# Patient Record
Sex: FEMALE | Race: ASIAN | NOT HISPANIC OR LATINO | Employment: STUDENT | URBAN - METROPOLITAN AREA
[De-identification: names, ages, dates, MRNs, and addresses within clinical notes are randomized per-mention and may not be internally consistent; named-entity substitution may affect disease eponyms.]

---
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
## 2024-01-03 ENCOUNTER — TELEPHONE (OUTPATIENT)
Age: 19
End: 2024-01-03

## 2024-01-03 NOTE — TELEPHONE ENCOUNTER
Caller: Jolly    Doctor: Yola     Reason for call: Has some question regarding her sx     Call back#: [phone]

## 2024-01-04 ENCOUNTER — ANESTHESIA EVENT (OUTPATIENT)
Dept: PERIOP | Facility: HOSPITAL | Age: 19
End: 2024-01-04
Payer: COMMERCIAL

## 2024-01-04 RX ORDER — ACETAMINOPHEN 500 MG
500 TABLET ORAL EVERY 6 HOURS PRN
COMMUNITY

## 2024-01-04 RX ORDER — GUANFACINE 2 MG/1
TABLET, EXTENDED RELEASE ORAL EVERY MORNING
COMMUNITY

## 2024-01-04 NOTE — PRE-PROCEDURE INSTRUCTIONS
Pre-Surgery Instructions:   Medication Instructions    acetaminophen (TYLENOL) 500 mg tablet Hold day of surgery.    citalopram (CeleXA) 20 mg tablet Take day of surgery.    drospirenone-ethinyl estradiol (FLIP) 3-0.02 MG per tablet Take day of surgery.    fexofenadine (ALLEGRA) 60 MG tablet Take day of surgery.    guanFACINE HCl ER (Intuniv) 2 MG TB24 Take day of surgery.    lamoTRIgine (LaMICtal) 100 mg tablet Take day of surgery.    rimegepant sulfate (Nurtec) 75 mg TBDP Uses PRN- OK to take day of surgery    Ubrogepant (UBRELVY) 100 MG tablet Uses PRN- OK to take day of surgery    Medication instructions for day surgery reviewed. Please use only a sip of water to take your instructed medications. Avoid all over the counter vitamins, supplements and NSAIDS for one week prior to surgery per anesthesia guidelines. Tylenol is ok to take as needed.     You will receive a call one business day prior to surgery with an arrival time and hospital directions. If your surgery is scheduled on a Monday, the hospital will be calling you on the Friday prior to your surgery. If you have not heard from anyone by 8pm, please call the hospital supervisor through the hospital  at [phone]. (Sudeep [phone]).    Do not eat or drink anything after midnight the night before your surgery, including candy, mints, lifesavers, or chewing gum. Do not drink alcohol 24hrs before your surgery. Try not to smoke at least 24hrs before your surgery.       Follow the pre surgery showering instructions as listed in the “My Surgical Experience Booklet” or otherwise provided by your surgeon's office. Do not use a blade to shave the surgical area 1 week before surgery. It is okay to use a clean electric clippers up to 24 hours before surgery. Do not apply any lotions, creams, including makeup, cologne, deodorant, or perfumes after showering on the day of your surgery. Do not use dry shampoo, hair spray, hair gel, or any type of hair  products.     No contact lenses, eye make-up, or artificial eyelashes. Remove nail polish, including gel polish, and any artificial, gel, or acrylic nails if possible. Remove all jewelry including rings and body piercing jewelry.     Wear causal clothing that is easy to take on and off. Consider your type of surgery.    Keep any valuables, jewelry, piercings at home. Please bring any specially ordered equipment (sling, braces) if indicated.    Arrange for a responsible person to drive you to and from the hospital on the day of your surgery. Visitor Guidelines discussed.     Call the surgeon's office with any new illnesses, exposures, or additional questions prior to surgery.    Please reference your “My Surgical Experience Booklet” for additional information to prepare for your upcoming surgery.

## 2024-01-05 ENCOUNTER — TELEMEDICINE (OUTPATIENT)
Dept: NEUROLOGY | Facility: CLINIC | Age: 19
End: 2024-01-05
Payer: COMMERCIAL

## 2024-01-05 DIAGNOSIS — G43.009 MIGRAINE WITHOUT AURA AND WITHOUT STATUS MIGRAINOSUS, NOT INTRACTABLE: Primary | ICD-10-CM

## 2024-01-05 PROCEDURE — 99214 OFFICE O/P EST MOD 30 MIN: CPT | Performed by: PSYCHIATRY & NEUROLOGY

## 2024-01-05 RX ORDER — DEXAMETHASONE 4 MG/1
TABLET ORAL
Qty: 9 TABLET | Refills: 0 | Status: SHIPPED | OUTPATIENT
Start: 2024-01-05

## 2024-01-05 NOTE — PATIENT INSTRUCTIONS
Please follow-up with eye doctor for dilated eye exam and let me know if they see any signs of anything unusual at the back your eye and please do not trust that they will send me the report.      Headache/migraine treatment:   Abortive medications (for immediate treatment of a headache):   It is ok to take ibuprofen, acetaminophen or naproxen (Advil, Tylenol,  Aleve, Excedrin) if they help your headaches you should limit these to No more than 3 times a week to avoid medication overuse/rebound headaches.     For your more moderate to severe migraines take this medication early   -   Ubrelvy 100 mg at migraine onset and can repeat in 2 hours if needed for max 2 doses in 24 hours and you get 16 tabs a month as there is some likelihood that the more you take this it also helps with prevention    If you have a co-pay that is anything of significance there is a coupon card  @ubrelvy.com to cover this    Back up:  -     dexamethasone (DECADRON) 4 mg tablet; Can take 8 mg p.o. with breakfast for 1 to 2 days followed by 4 mg for 1 to 5 days for unrelenting migraine      Over the counter preventive supplements for headaches/migraines (if you try, try for 3 months straight)   (to take every day to help prevent headaches - not to take at the time of headache):  There are combo pills online of these - none of which regulated by FDA and double check dosing - take appropriate dose only once a day- preventa migraine, migravent, mind ease, migrelief   []  Magnesium 400mg daily (If any diarrhea or upset stomach, decrease dose  as tolerated)  []  Riboflavin (Vitamin B2) 400mg daily - try online   (FYI B2 may make your urine bright/neon yellow)  [] Butter bur     Prescription preventive medications for headaches/migraines   (to take every day to help prevent headaches - not to take at the time of headache):  [x]  We have options if needed        Lifestyle Recommendations:  [x] SLEEP - Maintain a regular sleep schedule: Adults need at  least 7-8 hours of uninterrupted a night. Maintain good sleep hygiene:  Going to bed and waking up at consistent times, avoiding excessive daytime naps, avoiding caffeinated beverages in the evening, avoid excessive stimulation in the evening and generally using bed primarily for sleeping.  One hour before bedtime would recommend turning lights down lower, decreasing your activity (may read quietly, listen to music at a low volume). When you get into bed, should eliminate all technology (no texting, emailing, playing with your phone, iPad or tablet in bed).  [x] HYDRATION - Maintain good hydration.  Drink  2L of fluid a day (4 typical small water bottles)  [x] DIET - Maintain good nutrition. In particular don't skip meals and try and eat healthy balanced meals regularly.  [x] TRIGGERS - Look for other triggers and avoid them: Limit caffeine to 1-2 cups a day or less. Avoid dietary triggers that you have noticed bring on your headaches (this could include aged cheese, peanuts, MSG, aspartame and nitrates).  [x] EXERCISE - physical exercise as we all know is good for you in many ways, and not only is good for your heart, but also is beneficial for your mental health, cognitive health and  chronic pain/headaches. I would encourage at the least 5 days of physical exercise weekly for at least 30 minutes.     Education and Follow-up  [x] Please call with any questions or concerns. Of course if any new concerning symptoms go to the emergency department.  [x] Follow up May or June 2024, sooner if needed

## 2024-01-05 NOTE — PROGRESS NOTES
Virtual Regular Visit    Verification of patient location:    Patient is located at Home in the following state in which I hold an active license [place]      Assessment/Plan:    Problem List Items Addressed This Visit    None  Visit Diagnoses       Migraine without aura and without status migrainosus, not intractable    -  Primary    Relevant Medications    dexamethasone (DECADRON) 4 mg tablet                 Reason for visit is   Chief Complaint   Patient presents with    Migraine        Encounter provider Yesi Huber MD    Provider located at [facility]  NEUROLOGY [address]  [place] [address] [phone]      Recent Visits  No visits were found meeting these conditions.  Showing recent visits within past 7 days and meeting all other requirements  Today's Visits  Date Type Provider Dept   01/05/24 Telemedicine Yesi Huber MD [facility]   Showing today's visits and meeting all other requirements  Future Appointments  No visits were found meeting these conditions.  Showing future appointments within next 150 days and meeting all other requirements       The patient was identified by name and date of birth. Jolly Ibanez was informed that this is a telemedicine visit and that the visit is being conducted through the Epic Embedded platform. She agrees to proceed..  My office door was closed. The patient was notified the following individuals were present in the room med student Masoud Wang.  She acknowledged consent and understanding of privacy and security of the video platform. The patient has agreed to participate and understands they can discontinue the visit at any time.    Patient is aware this is a billable service.     Subjective    Assessment/Plan:   Jolly Ibanez is a pleasant  18 y.o. female with a past medical history that includes PTSD, anxiety, depression, disruptive mood dysregulation disorder, PMS, PMDD, seasonal  allergies, Migraines, insomnia, BMI over 40, hypertension and tachycardia referred here for evaluation of headache.  My initial evaluation 2/1/2022     Migraine without aura and without status migrainosus, not intractable  H/O concussion - resolved  No EMILY while sleeping on side (5/18/23 PSG, only 0.4% of study on back)  Jolly has a history of migraines prior to the hit to the head about once a week on average and worse around menses.  On 10/15/2021, a metal latch fell on the bridge of her nose, and at 1st she denied any acute symptoms, except for pain in the region followed by about 20 minutes later fatigue and other constellation of symptoms that can be typical of concussion.  She followed up with primary care provider and later emergency department 10/18/2021 where noncontrast head CT was unremarkable for acute pathology.  She subsequently followed up with Sports Medicine and Physical therapy and was recently evaluated by Occupational therapy.  She feels these therapies are helping.  - as of 02/01/2022 she reports gradual improvement of symptoms although still having daily posttraumatic headaches they have gone from severe and disabling every day to mild 2 to 3/10 daily and worse moderate 2 to 3 times a week.  She is not interested in prescription preventative, will offer rizatriptan for abortive of more significant migraines.  She has a history of mood disorder which has been exacerbated by the stress and trauma this incident and removal from normal routine we discussed gradual return to normalcy.  Continue to follow with counselor, on Lamictal through PCP.  We discussed symptoms of posttraumatic stress and functional neurologic disorder contributing to symptoms.  - as of 3/4/2022: She reports improvement since last visit, balance improving, still daily headaches, but milder and has not needed rizatriptan yet at all. Taking all 3 headache preventative supplements. Not seen sleep med yet. Looking for new  psychiatrist. Feels PT/OT helpful.  - as of 6/8/2022: She continues to have gradual improvement of symptoms including decreased frequency and severity of headaches and migraines with daily headaches much milder and about 3-4 significant migraines where she took rizatriptan in the past 3 months and at helped.  She is not interested in prescription preventative for headache and migraine at this time and has had adjustments of her mood medications including reacting citalopram and increasing lamotrigine.  - as of 2/6/2023: On 2/4/2023 was feeling lightheaded like she might pass out while out with boyfriend and on 2/5/2023 had prodrome as well of feeling off balance and lightheaded before what sounds like presyncopal event with some likely functional overlay that evening while studying for a test that was to be today. History solely from her recollection and report of what others told her.  She was evaluated in the ED locally afterwards and she reports vitals and blood work was normal and no imaging was performed.  Suspect sleep disorder playing a role and again highly recommended following through with sleep study ordered a year ago. In the past has been known to fall asleep when overwhelmed consistent with functional neurologic disorder/PTSD.  Pattern of events does not sound consistent with seizure disorder at this time, but certainly without brain imaging would want to obtain to rule out structural cause of these symptoms.  She does not have a car or drive currently.  Migraines ups and downs, were worse in October and a little better now with lifestyle changes 2-3 times a week and rizatriptan works typically.    - as of 3/27/2023: Normal MRI brain reviewed.  She reports improvement since last visit with headaches on average 3 to 4 a week with the headache preventative supplements and current medications through psychiatry and her more significant migraines respond to rizatriptan when she takes it.  She is not  interested in prescription preventative for headaches or migraines.  Sleep study scheduled for May and no more presyncopal or dissociative episodes since last visit, not driving anyway which we discussed.   - as of 6/27/2023: We discussed that since I last saw her,  I have discovered that many patients may have a subclinical IIH picture contributing to their symptoms following a concussion, which may have been part of her picture as well and I believe it can cause cervical medullary compression syndrome at times (theory and non surgically).  She is just about to stop citalopram and start Wellbutrin tomorrow for mood/ADHD and we discussed holding off on acetazolamide/Diamox trial at this point but could a time to not change more than 1 medication at once, but could add at any time.  She reports overall headaches are better now that summer classes are over and approximately 2 a week and rizatriptan may help a little but not much and she does not like taking meds.  We discussed steroids may help even more if needed in the future for severe symptoms.  Sleep study was negative for sleep apnea while sleeping on her left side the entire night and we discussed I still suspect she has sleep apnea on her back which will not matter with the negative test as long as she continues to sleep on her side.  Recommended Zzoma pillow.   - as of 8/24/2023: She is doing well overall with approximately 3-4 headaches a week that typically resolve with acetaminophen/Tylenol and rizatriptan works sometimes, but trial of sumatriptan to see if this can work better and if not certainly could try Nurtec to see if this could help for each episode as more she takes it also can help with prevention.  She did not tolerate psychiatry trial of Wellbutrin and was put back on citalopram and added guanfacine.  We discussed sleep hygiene and headache management and will hold off on additional headache preventatives at this time unless needed.  She will  follow-up with eye doctor and let me know if any concerning findings due to her subtle left vision issues and certainly if she had papilledema that would  but otherwise she does not meet criteria for IIH at this time.  - as of 1/5/2024: She reports on average 5-6 migraines a month and sumatriptan side effects, Nurtec denied and Ubrelvy preferred by insurance and typically works well for migraine rescue.  Typically does not need a second and 24 hours and denies any side effects.  She is not interested in adding migraine prescription preventative.  She had some recent episodes that she reports are similar to what have been thought to be related to functional neurologic disorder in the past, sounds like possible vasovagal syncope in the setting of strep in November and prolonged recovery.  We discussed at any point could consider acetazolamide type medication to see if they could help with multiple symptoms if interested although sometimes difficult to tolerate since has to be taken every 3 hours until titrating up to 500 mg twice daily which also may only last 8 hours.  Dexamethasone available for backup for unrelenting migraine or to see if it could help in an atypical FND episode.    Workup:  -MRI brain with and without contrast 3/13/2023: per radiology Normal MRI of the brain.  Mild sinus disease. *On my retrospective review 6/27/2023 we discussed I see some findings of suspected mild increased intracranial pressure including slightly flattened sella (however not empty and not partially empty), slightly prominent optic nerve sheaths with mildly tortuous optic nerves, slight pallor on left and slightly flattened sclera in my opinion, radiology would label this as non specific    -Eye exam uploaded from 12/22/2023 in our system under media dated 1/3/2024 with no papilledema, diagnosis bilateral myopia    Preventative:  - we discussed headache hygiene and lifestyle factors that may improve  headaches  - she is not interested in prescription preventative at this time and we discussed options if she becomes interested - continue headache preventative supplements including magnesium, riboflavin and butterbur   - Currently on through other providers:  Lamotrigine 100 mg in am, citalopram 20 mg in am, guanfacine 2 mg for ADHD since 8/5/23 and increased to 2 mg around Oct 2023 - has noticed a difference and helping   - Past/ failed/contraindicated:  Fluoxetine, sertraline, citalopram, amitriptyline and venlafaxine would be contraindicated due to potential interaction with current medications, wellbutrin made her angry, aggressive and anxious all the time   - future options: Acetazolamide/Diamox, topiramate, propranolol, CGRP med, botox    Abortive:  - discussed not taking over-the-counter or prescription pain medications more than 3 days per week to prevent medication overuse/rebound headache  - continue Ubrelvy 100 mg as needed. Discussed proper use, possible side effects and risks.  - Currently on through other providers: tylenol   - Past/ failed/contraindicated:  She reports No NSAIDs due to allergy of swelling, rizatriptan, sumatriptan  - future options:  prochlorperazine, could consider trial for 5 days of Depakote or dexamethasone for prolonged migraine, reyvow, nurtec (had to try ubrelvy first)  - Safe driving precautions, should not drive at all if feeling sleepy or cognitively not well.        Patient instructions          Headache/migraine treatment:   Abortive medications (for immediate treatment of a headache):   It is ok to take ibuprofen, acetaminophen or naproxen (Advil, Tylenol,  Aleve, Excedrin) if they help your headaches you should limit these to No more than 3 times a week to avoid medication overuse/rebound headaches.     For your more moderate to severe migraines take this medication early   -   Ubrelvy 100 mg at migraine onset and can repeat in 2 hours if needed for max 2 doses in 24  hours and you get 16 tabs a month as there is some likelihood that the more you take this it also helps with prevention    If you have a co-pay that is anything of significance there is a coupon card  @ubrelvy.com to cover this    Back up:  -     dexamethasone (DECADRON) 4 mg tablet; Can take 8 mg p.o. with breakfast for 1 to 2 days followed by 4 mg for 1 to 5 days for unrelenting migraine      Over the counter preventive supplements for headaches/migraines (if you try, try for 3 months straight)   (to take every day to help prevent headaches - not to take at the time of headache):  There are combo pills online of these - none of which regulated by FDA and double check dosing - take appropriate dose only once a day- preventa migraine, migravent, mind ease, migrelief   []  Magnesium 400mg daily (If any diarrhea or upset stomach, decrease dose  as tolerated)  []  Riboflavin (Vitamin B2) 400mg daily - try online   (FYI B2 may make your urine bright/neon yellow)  [] Butter bur     Prescription preventive medications for headaches/migraines   (to take every day to help prevent headaches - not to take at the time of headache):  [x]  We have options if needed        Lifestyle Recommendations:  [x] SLEEP - Maintain a regular sleep schedule: Adults need at least 7-8 hours of uninterrupted a night. Maintain good sleep hygiene:  Going to bed and waking up at consistent times, avoiding excessive daytime naps, avoiding caffeinated beverages in the evening, avoid excessive stimulation in the evening and generally using bed primarily for sleeping.  One hour before bedtime would recommend turning lights down lower, decreasing your activity (may read quietly, listen to music at a low volume). When you get into bed, should eliminate all technology (no texting, emailing, playing with your phone, iPad or tablet in bed).  [x] HYDRATION - Maintain good hydration.  Drink  2L of fluid a day (4 typical small water bottles)  [x] DIET -  Maintain good nutrition. In particular don't skip meals and try and eat healthy balanced meals regularly.  [x] TRIGGERS - Look for other triggers and avoid them: Limit caffeine to 1-2 cups a day or less. Avoid dietary triggers that you have noticed bring on your headaches (this could include aged cheese, peanuts, MSG, aspartame and nitrates).  [x] EXERCISE - physical exercise as we all know is good for you in many ways, and not only is good for your heart, but also is beneficial for your mental health, cognitive health and  chronic pain/headaches. I would encourage at the least 5 days of physical exercise weekly for at least 30 minutes.     Education and Follow-up  [x] Please call with any questions or concerns. Of course if any new concerning symptoms go to the emergency department.  [x] Follow up May or June 2024, sooner if needed        CC:   We had the pleasure of evaluating Jolly Ibanez in neurological consultation today. Jolly Ibanez is a   right handed female who presents today for evaluation of headaches.     History obtained from patient as well as available medical record review.  History of Present Illness:   Interval history as of 1/5/2024  - she reports she feels fine  - school is going well   - drives sparingly, reports she would never drive if feeling sick or tired   - a few episodes similar to what have been worked up and labeled functional neurologic disorder in the past, since last visit - some details in EMR, but not all, spoke with her around that time, viral illness symptoms, plus possible neurocardiogenic syncope and sent to  ER 11/12/23 and turned out she had strep and although she did not have throat symptoms that she recalls at the time only GI symptoms, other times needed a minute and then was ok. After the Nov incident that turned out to be strep was on a soup diet for 1.5 weeks and then felt better. All happened away from here so briefly summarized and she reports she is doing well now.      -Eye exam uploaded from 12/22/2023 in our system under media dated 1/3/2024 with no papilledema, diagnosis bilateral myopia    - surgery on foot in 4 days, non union fracture in foot - back to school 1/16/24    Headaches and migraines   5-6 migraines a month - works pretty well    Preventative:   - Currently on through other providers:  Lamotrigine 100 mg in am, citalopram 20 mg in am, guanfacine 2 mg for ADHD since 8/5/23 and increased to 2 mg around Oct 2023 - has noticed a difference and helping    Abortive:   -Trial of sumatriptan hand side effects and therefore I sent Nurtec 9/18/2023 which was denied and Ubrelvy preferred by your insurance - didn't pay a lot - 16 tabs and no SE and only once took two     Denies bothersome side effects     Interval history as of 8/24/2023  - no significant new or concerning neurologic symptoms since last visit   - sleep - 6 hours, up every couple of hours, side sleeping, nightmares 1-2 times a week   - mostly at home, beach yesterday  - eye doctor 12/2021 - wear glasses - some blurred vision at times, worse in left she thinks   (5-6 months prior had seen [facility] and got glasses and then worse after concussion, and then found they over corrected her and she has a stigmatism)    Headaches and migraines   Jolly gets 3-4 headaches a week, normally she takes tylenol for them and they resolve, but it they are really bad she takes rizatriptan and that usually works.     Preventative:   - Currently on through other providers:  Lamotrigine 100 mg in am, citalopram 20 mg in am, guanfacine 2 mg for ADHD since 8/5/23 and increased to 2 mg around Oct 2023 - has noticed a difference and helping  - wellbutrin made her angry, aggressive and anxious all the time     Abortive:   Rizatriptan works sometimes, but cannot take it as often as she needs to    Interval history as of 6/27/2023  - no significant new or concerning neurologic symptoms since last visit     Headaches and migraines   2  headaches a week and rizatriptan works   Bifrontal pressure  Better now that school is done for the summer, class ended on 6/8/23 gen chem 2   ADHD feels worse than in HS and therefore they are switching her to wellbutrin for that reasons     Tripped over two months ago and swollen two weeks later, two weeks after bruised     Preventative:   - Currently on through other providers:  Lamotrigine 100 mg, citalopram 20 mg weaning off this month and starting wellbutrin tomorrow     Abortive:   - rizatriptan may help a little but not much and does not like taking meds, sometimes sleeps   Denies bothersome side effects   - May 14 -20 steroids for ankle  - no isses     Sleep  In lab study 5/19/23  There were a total of 2 respiratory events made up of 0 obstructive apneas, 0 mixed apneas, 0 central apneas, and 2 hypopneas resulting in an apnea/hypopnea index (AHI) of 0.3 events per hour of sleep.  The AHI in the supine position was 0.0.  The AHI during REM sleep was 0.0.  No snoring was noted on the study night.  OXIMETRY: The baseline oxygen saturation with the patient awake was 97.8%.  The mean oxygen saturation throughout the study was 97.5%.  The lowest oxygen saturation was 93.0%.  BODY POSITION: The patient slept 0.4% of the evening in the supine position, 99.6% on left side, 0.0% on right side, and 0.0%  in the prone position.   IMPRESSION:   This study demonstrated no evidence for sleep disordered breathing or movement disorder.  Normal sleep efficiency and sleep architecture         Interval history as of 3/27/2023  - no significant new or concerning neurologic symptoms since last visit   -MRI brain with and without contrast 3/13/2023: Normal MRI of the brain.  Mild sinus disease.  - sleep study scheduled for May 18th 2023  - Mood - just started seeing a therapist at college, overall ok, 2 apmts so far, psychiatry next unknown, last was 3/11/23 with NP     Headaches and migraines   MRI brain reviewed  She has been  doing better since I last saw her she reports headaches 3 to 4 days/week which is better than daily and constant and rizatriptan does help when she needs it.    Preventative:   - headache preventative supplements  - Currently on through other providers:  Lamotrigine 100 mg, citalopram 20 mg    Abortive:   -Rizatriptan - works  Denies bothersome side effects        Interval history as of 2/6/2023  -Since last visit I wrote letter to her school for disability services for migraines including ability fracture time if necessary  - light bump 2-3 days ago while getting something from sisters bed and not too hard, and no symptoms at the time except headache later   - sat out with BF and and had to sit down as almost past out, 1 hydroflask water a day   - started feeling off balance while walking to dinner with friend, lightheaded, ate at dinner, went back to dorm to study and making flashcards and two hours later started feeling like she could barely keep head up and friends left room for a moment and was slumped over and barely continuous and sitting at a desk and they helped her up and she slumped back down to chair and then they got RA. They eased her to the ground and called school EMS and they helped her out and turned to her side. While on the ground whole body shaking and would last 1-1.5 mins and then would start again and relaxed in hospital and she felt out of it.   No urinary or bowel incontinence, no tongue biting, no history of seizures  In ED around 830/9 pm, took vitals abd blood work and came back normal and she says she couldn't really talk to them, was staring but couldn't really see them, responses were brief  - This morning was up and about with dad studying and eyes started to roll and he told her to control her breathing and everything got better.     Sleep  - not good per dad, tries to go bed by 12 and 647, broken    -I again recommended considering sleep study and referred her over a year  ago    Headaches and migraines   -10/14/2022 wrote me regarding an uptake in the number of headaches per week with more intense schoolwork since the beginning of the semester and has been forgetting a few meals which likely is part of the issue, but she reported she is exercising an hour a day due to walking around campus and sleeping 6 hours per night -I again recommended considering sleep study and referred her over a year ago -and asked if she would like to try prescription preventative medication for headaches    - were bad in Oct and then got a little better and now 2-3 times a week     Preventative:   - None specifically for headaches, headache preventative supplements  - Currently on through other providers:  Lamotrigine 100 mg (never higher), citalopram 20 mg     Abortive: Rizatriptan for migraines - works   Denies bothersome side effects        How likely are you to doze off or fall sleep during the following situations?  0 - would never doze  1 - slight chance of dozing  2 - moderate chance of dozing  3 - high chance of dozing  [place] sleepiness scale  Sitting and reading - 2  Watching TV - 1  Sitting, inactive in a public place such as a theater 1  As a passenger in a car for an hour without a break 2  Lying down to rest in afternoon when circumstances permit 2  Sitting and talking to someone 1  Sitting quietly after lunch without alcohol 1  In a car while stopped for few minutes in traffic - 0        Interval history as of 6/8/2022  - finished therapies now and feels they helped  - mood is better, continues to follow with therapist and has new Psychiatry and P adjusting medications  - going to college in the [employer] - wants to go into medicine   - still sometimes overwhelmed by overstimulation  - did not follow through with sleep medicine evaluation and I recommended this again to rule out other treatable causes of headaches and other symptoms as I suspect possible sleep apnea    Headaches and  "migraines   Less frequent and less intense  Still about once a day, about 1-2/10 now, normally can go up to a 5/10, up to 7-8/10  Taken rizatriptan about 3-4 times in 3 months     Preventative:   -  headache preventative supplement - already was on magnesium, B2, added butterbur  - through other providers: restarted citalopram and increase lamotrigine     Abortive: rizatriptan works, sometimes takes a while - up to an hour.   Denies bothersome side effects    Interval history as of 3/4/2022  - denies any new or concerning neurologic symptoms since last visit   - easing into normalcy, stopped wearing hat to prevent light from bothering her, has noticed that she gets more visual snow  - Balance improving, working with PT on fine tuning. No recent falls.   - Has not seen sleep medicine    Headaches and migraines   - headaches less painful than what they used to be. Not taking maxalt as it never been bad enough.   21 headaches in a week. All different. Different triggers such as light or noise.   No new symptoms.     Preventative:    - trial of headache preventative supplement - already was on magnesium, B2, added butterbur  Abortive:   Trial rizatriptan-has not tried as has not had severe migraine    School  [employer], senior  Primary focus painting and art    Mood  - history of  PTSD, anxiety, depression, disruptive mood dysregulation disorder, PMS, PMDD, insomnia  - previously followed with Psychiatry, she was not the right person, looking for new provider  - follows with counselor she likes   - through peds - lamictal 75 mg in am  - 2 years, no SE  -  tried reading \"the body keeps the score\" on PTSD and triggers symptoms - okay'd stopping as may not be the right time to delve into that     History as of initial visit 2/1/22:  On 10/15/2021, a metal gate latch fell on the bridge of her nose at school, hitting glasses  Acute symptoms included:  No LOC, no amnesia, laughed at the time, did not feel " "weird until 20 mins later felt really tired, post traumatic headache right away, lightheaded, 2 hours later that day she fell from her chair and math class   She was evaluated by primary care provider  She was evaluated emergency department 10/18/2021 where she reported dizziness , fatigue, headache, nausea, imbalance.  Noncontrast head CT was unremarkable for acute pathology    She subsequently followed up with sports medicine   - 10/27/2021  - 11/18/2021  - 01/04/2022 - felt 83% back to normal and referred to neuro  She is also followed with optometry, PT and OT - vision therapy started yesterday     - as of 2/1/2022:  She reports gradual improvement of some symptoms still having daily posttraumatic headaches    School  [employer], senior  Primary focus painting and art  - was out of school for a week, then out intermittently sent home early  Back in school full time  accommodations - sometimes wears sunglasses or hats, can leave class early, extra time on assignments  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   - before this once a week, migraines more around menses   - as of 2/1/2022: daily severe headaches initially, now mild daily, moderate 2-3 a week   What time of the day do the headaches start?  Does not often wake up with them Build over the day  How long do the headaches last? Bad day the rest of the day  Are you ever headache free? yes    Aura? without aura     Last eye exam:   Saw " optometry fall 2021    Where is your headache located and pain quality?   - frontal - typically bilateral, but can be more on left   - sometimes a pressure, sometimes a , sometimes stabbing  What is the intensity of pain? Average: 2-3/10, worst 7/10  Associated symptoms:   [x] Nausea       [] Vomiting      [x] Photophobia     [x]Phonophobia      [] Osmophobia  [x] Blurred vision    [x] Prefer quiet, dark room  [x] Light-headed or dizzy - feels like she is swaying at times, waxes and wanes     [] Tinnitus   [] Hands or feet tingle or feel numb/paresthesias    [] Ptosis      [] Facial droop  [] Lacrimation  [] Nasal congestion/rhinorrhea        Things that make the headache worse? No specific movements, any shaking movement     Headache triggers:  Menses, stress, driving sometimes    Have you seen someone else for headaches or pain? Yes, Sports med   Have you had trigger point injection performed and how often? No  Have you had Botox injection performed and how often? No   Have you had epidural injections or transforaminal injections performed? No  Are you current pregnant or planning on getting pregnant? Not for years,  on birth control  Have you ever had any Brain imaging? ct    What medications do you take or have you taken for your headaches?   ABORTIVE:    Tylenol 2 pills in am helps    No NSAIDs due to allergy     PREVENTIVE:     Lamotrigine 75 mg daily       Past/ failed/contraindicated:  Fluoxetine  Sertraline  citalopram      Water: 3-4 bottles  per day  Caffeine: 1 cup coffee once a week       The following portions of the patient's history were reviewed and updated as appropriate: allergies, current medications, past family history, past medical history, past social history, past surgical history and problem list.    Pertinent family history:  Family history of headaches:  no known family members with significant headaches  Any family history of aneurysms - Yes - paternal grandmother     Pertinent  social history:  Work: 0  Education: HS  Lives with spit time with mom and dad    Past Medical History:   Diagnosis Date    Anxiety     Contact lens/glasses fitting     Depression     Fracture     non-union bone fragment right foot    Head injury 2021    trauma to the head-physical therapy and occupational therapy    Migraine     Piercing     8 total in the ears       Past Surgical History:   Procedure Laterality Date    ANKLE SURGERY Right        Current Outpatient Medications   Medication Sig Dispense Refill    acetaminophen (TYLENOL) 500 mg tablet Take 500 mg by mouth every 6 (six) hours as needed for mild pain      citalopram (CeleXA) 20 mg tablet Take 20 mg by mouth every morning      dexamethasone (DECADRON) 4 mg tablet Can take 8 mg p.o. with breakfast for 1 to 2 days followed by 4 mg for 1 to 5 days for unrelenting migraine 9 tablet 0    drospirenone-ethinyl estradiol (FLIP) 3-0.02 MG per tablet Take 1 tablet by mouth every morning      fexofenadine (ALLEGRA) 60 MG tablet Take 60 mg by mouth every morning      guanFACINE HCl ER (Intuniv) 2 MG TB24 Take by mouth every morning      lamoTRIgine (LaMICtal) 100 mg tablet Take 100 mg by mouth every morning      Ubrogepant (UBRELVY) 100 MG tablet Take 1 tablet (100 mg) one time as needed for migraine. May repeat one additional tablet (100 mg) at least two hours after the first dose. Do not use more than two doses per day 16 tablet 11     No current facility-administered medications for this visit.        Allergies   Allergen Reactions    Ibuprofen Swelling and Anaphylaxis    Dog Epithelium Itching     Itching and hives-cats/dogs         Objective:     Exam limited by Video  Physical Exam:                                                                 Vitals:            Constitutional:    LMP 12/31/2023 (Exact Date)   BP Readings from Last 3 Encounters:   11/24/23 120/69   08/07/23 130/80   02/06/23 125/64     Pulse Readings from Last 3 Encounters:   11/24/23 89    08/07/23 80   02/06/23 84         Well developed, well nourished, No dysmorphic features.       HEENT:  Normocephalic atraumatic. See neuro exam   Psychiatric:  Normal behavior and appropriate affect        Neurological Examination:     Mental status/cognitive function:   Recent and remote memory appear intact. Attention span and concentration as well as fund of knowledge appear appropriate for age. Normal language and spontaneous speech.  Cranial Nerves:  VII-facial expression symmetric  Motor Exam: symmetric bulk throughout. no atrophy, fasciculations or abnormal movements noted.   Coordination:  no apparent dysmetria, ataxia or tremor noted      Pertinent lab results:   See EMR for recent labs  - 10/18/2021 CMP and CBC unremarkable  Pregnancy test negative     Imaging:   I have personally reviewed imaging and radiology read   -MRI brain with and without contrast 3/13/2023: Normal MRI of the brain. Mild sinus disease.   *On my retrospective review 6/27/2023 we discussed I see some findings of suspected mild increased intracranial pressure including flattened sella, prominent optic nerve sheaths with mildly tortuous optic nerves and slightly flattened sclera.     - noncontrast head CT 10/18/2021:  No acute intracranial abnormality     Review of Systems:   ROS obtained by medical assistant and personally reviewed, but if any symptoms listed below say negative, does not mean patient has not had this symptom since last visit, please see HPI for details of symptoms discussed this visit. I recommended PCP follow up for non neurologic problems.    Review of Systems   Constitutional:  Negative for appetite change and fever.   HENT: Negative.  Negative for hearing loss, tinnitus, trouble swallowing and voice change.    Eyes: Negative.  Negative for photophobia and pain.   Respiratory: Negative.  Negative for shortness of breath.    Cardiovascular: Negative.  Negative for palpitations.   Gastrointestinal: Negative.   Negative for nausea and vomiting.   Endocrine: Negative.  Negative for cold intolerance.   Genitourinary: Negative.  Negative for dysuria, frequency and urgency.   Musculoskeletal: Negative.  Negative for myalgias and neck pain.   Skin: Negative.  Negative for rash.   Neurological:  Positive for headaches. Negative for dizziness, tremors, seizures, syncope, facial asymmetry, speech difficulty, weakness, light-headedness and numbness.   Hematological: Negative.  Does not bruise/bleed easily.   Psychiatric/Behavioral: Negative.  Negative for confusion, hallucinations and sleep disturbance.    All other systems reviewed and are negative.    I have spent 22 minutes with Patient today in which greater than 50% of this time was spent in counseling/coordination of care regarding Diagnostic results, Prognosis, Risks and benefits of tx options, Instructions for management, Patient and family education, Importance of tx compliance, Risk factor reductions, Impressions, Counseling / Coordination of care, Documenting in the medical record, Reviewing / ordering tests, medicine, procedures  , and Obtaining or reviewing history  . I also spent 10 minutes non face to face for this patient the same day.           Visit Time  Total Visit Duration: 32

## 2024-01-05 NOTE — PROGRESS NOTES
Review of Systems   Constitutional:  Negative for appetite change and fever.   HENT: Negative.  Negative for hearing loss, tinnitus, trouble swallowing and voice change.    Eyes: Negative.  Negative for photophobia and pain.   Respiratory: Negative.  Negative for shortness of breath.    Cardiovascular: Negative.  Negative for palpitations.   Gastrointestinal: Negative.  Negative for nausea and vomiting.   Endocrine: Negative.  Negative for cold intolerance.   Genitourinary: Negative.  Negative for dysuria, frequency and urgency.   Musculoskeletal: Negative.  Negative for myalgias and neck pain.   Skin: Negative.  Negative for rash.   Neurological:  Positive for headaches. Negative for dizziness, tremors, seizures, syncope, facial asymmetry, speech difficulty, weakness, light-headedness and numbness.   Hematological: Negative.  Does not bruise/bleed easily.   Psychiatric/Behavioral: Negative.  Negative for confusion, hallucinations and sleep disturbance.    All other systems reviewed and are negative.

## 2024-01-08 NOTE — ANESTHESIA PREPROCEDURE EVALUATION
Procedure:  Removal of bone fragment secondary to non union (Right: Foot)    Relevant Problems   NEURO/PSYCH   (+) Chronic pain of right ankle      PULMONARY   (+) Obstructive sleep apnea (adult) (pediatric)      Musculoskeletal and Integument   (+) Nonunion of foot fracture, right   Morbidly obese     Physical Exam    Airway    Mallampati score: III  TM Distance: >3 FB  Neck ROM: full     Dental   Comment: Denies loose teeth     Cardiovascular  Cardiovascular exam normal    Pulmonary  Pulmonary exam normal     Other Findings  Portions of exam deferred due to low yield and/or unknown COVID statuspost-pubertal.      Anesthesia Plan  ASA Score- 2     Anesthesia Type- general with ASA Monitors.         Additional Monitors:     Airway Plan: LMA.           Plan Factors-Exercise tolerance (METS): >4 METS.    Chart reviewed.   Existing labs reviewed. Patient summary reviewed.    Patient is not a current smoker.              Induction- intravenous.    Postoperative Plan-     Informed Consent- Anesthetic plan and risks discussed with patient.  I personally reviewed this patient with the CRNA. Discussed and agreed on the Anesthesia Plan with the CRNA..

## 2024-01-09 ENCOUNTER — ANESTHESIA (OUTPATIENT)
Dept: PERIOP | Facility: HOSPITAL | Age: 19
End: 2024-01-09
Payer: COMMERCIAL

## 2024-01-09 ENCOUNTER — APPOINTMENT (OUTPATIENT)
Dept: RADIOLOGY | Facility: HOSPITAL | Age: 19
End: 2024-01-09
Payer: COMMERCIAL

## 2024-01-09 ENCOUNTER — HOSPITAL ENCOUNTER (OUTPATIENT)
Facility: HOSPITAL | Age: 19
Setting detail: OUTPATIENT SURGERY
Discharge: HOME | End: 2024-01-09
Attending: STUDENT IN AN ORGANIZED HEALTH CARE EDUCATION/TRAINING PROGRAM | Admitting: STUDENT IN AN ORGANIZED HEALTH CARE EDUCATION/TRAINING PROGRAM
Payer: COMMERCIAL

## 2024-01-09 VITALS
TEMPERATURE: 97.6 F | HEART RATE: 89 BPM | RESPIRATION RATE: 18 BRPM | WEIGHT: 245.15 LBS | OXYGEN SATURATION: 99 % | HEIGHT: 63 IN | BODY MASS INDEX: 43.44 KG/M2 | SYSTOLIC BLOOD PRESSURE: 117 MMHG | DIASTOLIC BLOOD PRESSURE: 77 MMHG

## 2024-01-09 DIAGNOSIS — G89.18 ACUTE POST-OPERATIVE PAIN: ICD-10-CM

## 2024-01-09 DIAGNOSIS — M79.671 RIGHT FOOT PAIN: ICD-10-CM

## 2024-01-09 DIAGNOSIS — S92.901K NONUNION OF FOOT FRACTURE, RIGHT: ICD-10-CM

## 2024-01-09 DIAGNOSIS — Z98.890 POSTOPERATIVE STATE: Primary | ICD-10-CM

## 2024-01-09 LAB
ATRIAL RATE: 71 BPM
ATRIAL RATE: 75 BPM
EXT PREGNANCY TEST URINE: NEGATIVE
EXT. CONTROL: NORMAL
P AXIS: 29 DEGREES
P AXIS: 33 DEGREES
PR INTERVAL: 142 MS
PR INTERVAL: 146 MS
QRS AXIS: 51 DEGREES
QRS AXIS: 54 DEGREES
QRSD INTERVAL: 86 MS
QRSD INTERVAL: 90 MS
QT INTERVAL: 402 MS
QT INTERVAL: 408 MS
QTC INTERVAL: 443 MS
QTC INTERVAL: 448 MS
T WAVE AXIS: 30 DEGREES
T WAVE AXIS: 37 DEGREES
VENTRICULAR RATE: 71 BPM
VENTRICULAR RATE: 75 BPM

## 2024-01-09 PROCEDURE — 28020 EXPLORATION OF FOOT JOINT: CPT | Performed by: STUDENT IN AN ORGANIZED HEALTH CARE EDUCATION/TRAINING PROGRAM

## 2024-01-09 PROCEDURE — 88307 TISSUE EXAM BY PATHOLOGIST: CPT | Performed by: PATHOLOGY

## 2024-01-09 PROCEDURE — 99024 POSTOP FOLLOW-UP VISIT: CPT | Performed by: STUDENT IN AN ORGANIZED HEALTH CARE EDUCATION/TRAINING PROGRAM

## 2024-01-09 PROCEDURE — 93005 ELECTROCARDIOGRAM TRACING: CPT

## 2024-01-09 PROCEDURE — NC001 PR NO CHARGE: Performed by: STUDENT IN AN ORGANIZED HEALTH CARE EDUCATION/TRAINING PROGRAM

## 2024-01-09 PROCEDURE — 73620 X-RAY EXAM OF FOOT: CPT

## 2024-01-09 PROCEDURE — 81025 URINE PREGNANCY TEST: CPT | Performed by: ANESTHESIOLOGY

## 2024-01-09 PROCEDURE — 88311 DECALCIFY TISSUE: CPT | Performed by: PATHOLOGY

## 2024-01-09 PROCEDURE — C1713 ANCHOR/SCREW BN/BN,TIS/BN: HCPCS | Performed by: STUDENT IN AN ORGANIZED HEALTH CARE EDUCATION/TRAINING PROGRAM

## 2024-01-09 RX ORDER — PROPOFOL 10 MG/ML
INJECTION, EMULSION INTRAVENOUS AS NEEDED
Status: DISCONTINUED | OUTPATIENT
Start: 2024-01-09 | End: 2024-01-09

## 2024-01-09 RX ORDER — OXYCODONE HYDROCHLORIDE AND ACETAMINOPHEN 5; 325 MG/1; MG/1
1 TABLET ORAL EVERY 4 HOURS PRN
Status: CANCELLED | OUTPATIENT
Start: 2024-01-09

## 2024-01-09 RX ORDER — CHLORHEXIDINE GLUCONATE 4 G/100ML
SOLUTION TOPICAL DAILY PRN
Status: DISCONTINUED | OUTPATIENT
Start: 2024-01-09 | End: 2024-01-10 | Stop reason: HOSPADM

## 2024-01-09 RX ORDER — MAGNESIUM HYDROXIDE 1200 MG/15ML
LIQUID ORAL AS NEEDED
Status: DISCONTINUED | OUTPATIENT
Start: 2024-01-09 | End: 2024-01-09 | Stop reason: HOSPADM

## 2024-01-09 RX ORDER — FENTANYL CITRATE 50 UG/ML
INJECTION, SOLUTION INTRAMUSCULAR; INTRAVENOUS AS NEEDED
Status: DISCONTINUED | OUTPATIENT
Start: 2024-01-09 | End: 2024-01-09

## 2024-01-09 RX ORDER — HYDROMORPHONE HCL/PF 1 MG/ML
SYRINGE (ML) INJECTION AS NEEDED
Status: DISCONTINUED | OUTPATIENT
Start: 2024-01-09 | End: 2024-01-09

## 2024-01-09 RX ORDER — ACETAMINOPHEN 325 MG/1
650 TABLET ORAL EVERY 4 HOURS PRN
Status: CANCELLED | OUTPATIENT
Start: 2024-01-09

## 2024-01-09 RX ORDER — MIDAZOLAM HYDROCHLORIDE 2 MG/2ML
INJECTION, SOLUTION INTRAMUSCULAR; INTRAVENOUS AS NEEDED
Status: DISCONTINUED | OUTPATIENT
Start: 2024-01-09 | End: 2024-01-09

## 2024-01-09 RX ORDER — SODIUM CHLORIDE, SODIUM LACTATE, POTASSIUM CHLORIDE, CALCIUM CHLORIDE 600; 310; 30; 20 MG/100ML; MG/100ML; MG/100ML; MG/100ML
125 INJECTION, SOLUTION INTRAVENOUS CONTINUOUS
Status: DISCONTINUED | OUTPATIENT
Start: 2024-01-09 | End: 2024-01-10 | Stop reason: HOSPADM

## 2024-01-09 RX ORDER — ONDANSETRON 2 MG/ML
INJECTION INTRAMUSCULAR; INTRAVENOUS AS NEEDED
Status: DISCONTINUED | OUTPATIENT
Start: 2024-01-09 | End: 2024-01-09

## 2024-01-09 RX ORDER — LIDOCAINE HYDROCHLORIDE 10 MG/ML
INJECTION, SOLUTION EPIDURAL; INFILTRATION; INTRACAUDAL; PERINEURAL AS NEEDED
Status: DISCONTINUED | OUTPATIENT
Start: 2024-01-09 | End: 2024-01-09

## 2024-01-09 RX ORDER — OXYCODONE HYDROCHLORIDE AND ACETAMINOPHEN 5; 325 MG/1; MG/1
1 TABLET ORAL EVERY 4 HOURS PRN
Qty: 10 TABLET | Refills: 0 | Status: SHIPPED | OUTPATIENT
Start: 2024-01-09 | End: 2024-01-19

## 2024-01-09 RX ORDER — CHLORHEXIDINE GLUCONATE ORAL RINSE 1.2 MG/ML
15 SOLUTION DENTAL ONCE
Status: COMPLETED | OUTPATIENT
Start: 2024-01-09 | End: 2024-01-09

## 2024-01-09 RX ORDER — FENTANYL CITRATE/PF 50 MCG/ML
25 SYRINGE (ML) INJECTION
Status: DISCONTINUED | OUTPATIENT
Start: 2024-01-09 | End: 2024-01-09 | Stop reason: HOSPADM

## 2024-01-09 RX ORDER — CEFAZOLIN SODIUM 2 G/50ML
2000 SOLUTION INTRAVENOUS ONCE
Status: COMPLETED | OUTPATIENT
Start: 2024-01-09 | End: 2024-01-09

## 2024-01-09 RX ORDER — DEXAMETHASONE SODIUM PHOSPHATE 10 MG/ML
INJECTION, SOLUTION INTRAMUSCULAR; INTRAVENOUS AS NEEDED
Status: DISCONTINUED | OUTPATIENT
Start: 2024-01-09 | End: 2024-01-09

## 2024-01-09 RX ADMIN — FENTANYL CITRATE 50 MCG: 50 INJECTION, SOLUTION INTRAMUSCULAR; INTRAVENOUS at 13:45

## 2024-01-09 RX ADMIN — FENTANYL CITRATE 25 MCG: 50 INJECTION INTRAMUSCULAR; INTRAVENOUS at 15:23

## 2024-01-09 RX ADMIN — DEXAMETHASONE SODIUM PHOSPHATE 10 MG: 10 INJECTION, SOLUTION INTRAMUSCULAR; INTRAVENOUS at 13:53

## 2024-01-09 RX ADMIN — LIDOCAINE HYDROCHLORIDE 50 MG: 10 INJECTION, SOLUTION EPIDURAL; INFILTRATION; INTRACAUDAL; PERINEURAL at 13:39

## 2024-01-09 RX ADMIN — FENTANYL CITRATE 25 MCG: 50 INJECTION INTRAMUSCULAR; INTRAVENOUS at 15:09

## 2024-01-09 RX ADMIN — MIDAZOLAM 2 MG: 1 INJECTION INTRAMUSCULAR; INTRAVENOUS at 13:33

## 2024-01-09 RX ADMIN — CHLORHEXIDINE GLUCONATE 15 ML: 1.2 SOLUTION ORAL at 12:01

## 2024-01-09 RX ADMIN — FENTANYL CITRATE 50 MCG: 50 INJECTION, SOLUTION INTRAMUSCULAR; INTRAVENOUS at 13:56

## 2024-01-09 RX ADMIN — HYDROMORPHONE HYDROCHLORIDE 0.5 MG: 1 INJECTION, SOLUTION INTRAMUSCULAR; INTRAVENOUS; SUBCUTANEOUS at 14:28

## 2024-01-09 RX ADMIN — ONDANSETRON 4 MG: 2 INJECTION INTRAMUSCULAR; INTRAVENOUS at 13:53

## 2024-01-09 RX ADMIN — PROPOFOL 200 MG: 10 INJECTION, EMULSION INTRAVENOUS at 13:39

## 2024-01-09 RX ADMIN — HYDROMORPHONE HYDROCHLORIDE 0.5 MG: 1 INJECTION, SOLUTION INTRAMUSCULAR; INTRAVENOUS; SUBCUTANEOUS at 14:14

## 2024-01-09 RX ADMIN — PROPOFOL 50 MG: 10 INJECTION, EMULSION INTRAVENOUS at 13:45

## 2024-01-09 RX ADMIN — SODIUM CHLORIDE, SODIUM LACTATE, POTASSIUM CHLORIDE, AND CALCIUM CHLORIDE 125 ML/HR: .6; .31; .03; .02 INJECTION, SOLUTION INTRAVENOUS at 10:08

## 2024-01-09 RX ADMIN — CEFAZOLIN SODIUM 2000 MG: 2 SOLUTION INTRAVENOUS at 13:45

## 2024-01-09 NOTE — QUICK NOTE
"    H&P Interval Update      H&P performed on 1/3/24 was reviewed.     After examining the patient I find no changes in the patient's condition since the H&P had been written.     /82   Pulse 88   Temp (!) 97.3 °F (36.3 °C) (Skin)   Resp 18   Ht 5' 3\" (1.6 m)   Wt 111 kg (245 lb 2.4 oz)   LMP 12/31/2023 (Exact Date)   SpO2 98%   Breastfeeding No   BMI 43.43 kg/m²             General:  alert, awake, no acute distress   Head: atraumatic  Heart:  regular rate and rhythm  Lungs: Clear to auscultation bilaterally  Abdomen:  soft, non distended, no tenderness to palpation  Extremities:  no erythema, no edema    "

## 2024-01-09 NOTE — PROGRESS NOTES
"Podiatry - Progress Note  Patient: Jolly Ibanez 18 y.o. female   MRN: [id number]  PCP: David Davis MD  Unit/Bed#: [facility] Encounter: [id number]  Date Of Visit: 24    ASSESSMENT:    Jolly Ibanez is a 18 y.o. 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  Consent placed in chart.  To be signed with surgeon prior to procedure.  Confirmed NPO status.  H&P, vitals, and current labs reviewed.  No acute changes noted.  Alternatives, risks, and complications discussed with patient.  All questions answered.  No guarantees given to outcome of procedure.  Rest of medical care per primary team.    SUBJECTIVE:     The patient was seen, evaluated, and assessed at bedside today. The patient was awake, alert, and in no acute distress. Patient confirmed NPO status. All questions and concerns regarding the surgical procedure addressed. Patient understands risks vs benefits of procedure and remains amenable with plan for surgery today. Patient denies N/V/F/chills/SOB/CP.      OBJECTIVE:     Vitals:   /82   Pulse 88   Temp (!) 97.3 °F (36.3 °C) (Skin)   Resp 18   Ht 5' 3\" (1.6 m)   Wt 111 kg (245 lb 2.4 oz)   LMP 2023 (Exact Date)   SpO2 98%   Breastfeeding No   BMI 43.43 kg/m²     Temp (24hrs), Av.3 °F (36.3 °C), Min:97.3 °F (36.3 °C), Max:97.3 °F (36.3 °C)      Physical Exam:     General:  Alert, cooperative, and in no distress.  Lower extremity exam:  Cardiovascular status at baseline.  Neurological status at baseline.  Musculoskeletal status at baseline. No calf tenderness noted bilaterally. Dressing left intact to the Operating Room.     MSK:  -Pain on palpation to right lateral foot at the location of the sinus tarsi and calcaneocuboid joint.  -MMT 5/5 to all all muscle compartments of the right lower extremity     Neuro:  -Light sensation " "intact     Derm:  -No lesions, abrasions, or open wounds noted  -No noted interdigital maceration, peeling, malodor  -No callus formation noted on exam    Additional Data:     Labs:              Invalid input(s): \"LABALBU\"        * I Have Reviewed All Lab Data Listed Above.    Recent Cultures (last 7 days):               Imaging: I have personally reviewed pertinent films in PACS  EKG, Pathology, and Other Studies: I have personally reviewed pertinent reports.    ** Please Note: Portions of the record may have been created with voice recognition software. Occasional wrong word or \"sound a like\" substitutions may have occurred due to the inherent limitations of voice recognition software. Read the chart carefully and recognize, using context, where substitutions have occurred. **      "

## 2024-01-09 NOTE — ANESTHESIA POSTPROCEDURE EVALUATION
Post-Op Assessment Note    CV Status:  Stable  Pain Score: 3    Pain management: adequate       Mental Status:  Alert and awake   Hydration Status:  Euvolemic   PONV Controlled:  Controlled   Airway Patency:  Patent     Post Op Vitals Reviewed: Yes                  /84 (01/09/24 1500)    Temp 97.6 °F (36.4 °C) (01/09/24 1500)    Pulse (!) 107 (01/09/24 1500)   Resp 18 (01/09/24 1500)    SpO2 96 % (01/09/24 1500)

## 2024-01-09 NOTE — DISCHARGE SUMMARY
Discharge Summary Outpatient Procedure Podiatry -   Jolly Ibanez 18 y.o. female MRN: [id number]  Unit/Bed#: OR POOL Encounter: [id number]    Admission Date: 1/9/2024     Admitting Diagnosis: Nonunion of foot fracture, right [S92.901K]  Right foot pain [M79.671]    Discharge Diagnosis: same    Procedures Performed: Removal of bone fragment secondary to non union: 38713 (CPT®)    Complications: none    Condition at Discharge: stable    Discharge instructions/Information to patient and family:   See after visit summary for information provided to patient and family.      Provisions for Follow-Up Care/Important appointments:  See after visit summary for information related to follow-up care and any pertinent home health orders.      Discharge Medications:  See after visit summary for reconciled discharge medications provided to patient and family.

## 2024-01-09 NOTE — DISCHARGE INSTR - AVS FIRST PAGE
Dr. Aceves, DPM  Post-op surgery Instructions    Pain / Swelling  There is expected to be some discomfort, swelling and bruising of the foot. You might see some blood on the bandage. This is not a cause for alarm. However, if there is active or persistent bleeding (blood running out of the bandage while at rest) - call the office at once (or) go to a [facility] ER and ask them to page the podiatry residents.  Apply an ice bag to the top of your ankle for 30 minutes for each waking hour, for the first 72 hours. This should be discontinued when sleeping. This will also work through your cast if you have one. Ice must not leak and wet the dressings. Also, using the ice inappropriately can cause permanent nerve damage.  Your foot should be elevated as much as possible for the first 72 hours. The foot should be above heart level. If your foot is below heart level, throbbing and pain will increase.  When sleeping, elevation can be accomplished by putting a small hard suitcase between the box spring and mattress at the foot of the bed.  Walking and standing will increase pain, throbbing and bleeding.  Persistent pain despite elevation and your pain meds can many times be relieved by removing the tight brown compression layer (called the ACE wrap) that is over the white gauze dressing. If you are elevating and taking your pain meds and pain is still severe, remove this brown stretchy layer but leave the gauze intact. Wait 30 minutes. If the pain subsides, reapply the ACE so it’s not so tight. If pain doesn’t get better, call your doctor.   Dressings / Casts  Do not remove your surgical dressings - they will be changed at your doctor appointment. Do not allow surgical dressings to get wet. Sponge baths should be used until the sutures are removed.  Do not try to keep the foot dry using a garbage bag and tape - this rarely works.  If you get your dressings or cast wet - call your doctor immediately.  If your cast or  dressings feel tight - elevate your foot for 30 minutes. If this doesn’t help and you feel tingling or see toe discoloration - call your doctor or go to a [facility] ER and ask them to page the podiatry residents.   Do not put things in your cast such as powder, coat hangers to scratch, etc.. This can cause skin damage and infections.   Infection  If you have a fever at or above 100 degrees, chills, sweats, or see red streaks rising above the dressing or smell odor / see pus (creamy white drainage), call your doctor immediately or go to a [facility] ER and ask them to page the podiatry residents.  Constipation  If you have severe constipation after surgery, this can be due to the pain medication. Notify your doctor and special medication will be prescribed to deal with this.   Blood Clots  Based on the Caprini score, you will not require any medication for VTE.   Numbness  It is normal for your foot to be numb until about dinner time. If you’ve had a popliteal block procedure, you might be numb until the following day. When you start to feel pins and needles in the foot - this means the block is wearing off. That is the appropriate time to take your pain medication.   Pain Medication  Do not supplement your pain medication with over the counter drugs, old leftover pain medications, or extra Tylenol. You must discuss any additional medications with your doctor prior to taking them for pain.   Driving  No driving is allowed without discussion with the doctor  Ambulation  Weight bear as tolerated to surgical foot in CAM boot  If given a flat, stiff shoe / darco wedge shoe, Do not walk at all without it.  Use a device (cane, walker, crutches) to take some weight off of the foot when walking

## 2024-01-09 NOTE — PERIOPERATIVE NURSING NOTE
Received patient from PACU in APU room 10. Pient is alert and awake, VSS, no distress noted. Right LE neurovascular status WDL. Surgical dressing to right foot with cam boot clean, dry and intact. Patient is tolerating PO fluids, call bell placed in reach, will continue to monitor patient until d/c criteria met.

## 2024-01-09 NOTE — OP NOTE
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]     * Right foot pain [M79.671]    Procedure(s) (LRB):  Removal of bone fragment secondary to non union (Right)    Specimen(s):  ID Type Source Tests Collected by Time Destination   1 : loose bone fragment right calcaneocuboid foot Tissue Bone TISSUE EXAM Severiano Aceves DPM 2024 1411        Estimated Blood Loss:   Minimal    Drains:  * No LDAs found *    Anesthesia Type:   General with 10 ml of 1% Lidocaine and 0.25% Bupivacaine in a 1:1 mixture    Hemostasis:  Pneumatic ankle tourniquet set at 250 mmHg for 46 mins  Direct compression, electrocautery    Materials:  3-0 an 4-0 vicryl  4-0 nylon    Injectables:  None    Operative Findings:  Bone fragment was identified and resected, resection confirmed with c-arm. Bone fragement measured approximately 1 x 0.8 x 0.6cm, and was sent for pathology.     Complications:   None    Procedure and Technique:     Under mild sedation, the patient was brought into the operating room and placed on the operating room table in the supine position. IV sedation was achieved by anesthesia team and a universal timeout was performed where all parties are in agreement of correct patient, correct procedure and correct site. A pneumatic tourniquet was then placed over the patient's right lower extremity with ample padding. A V block was performed consisting of 10 ml of local anesthetic. The foot was then prepped and draped in the usual aseptic manner. An esmarch bandage was used to exsangunate the foot and the pneumatic tourniquet was then inflated to 250 mmHg.    Bone fragment  "resection:  Attention was directed to right lateral midfoot. A 5 cm linear incision was just distal to the sinus tarsi extending over anatomic alignment of Calcaneal cuboid joint. Joint was identified under c-arm guidance. Incision was carried down through the subcutaneous tissue taking care to identify and retract all vital neurovascular structures. Blunt dissection through subcutaneous tissue, bovie used for hemostasis. Upon reaching the extensor digitorum brevis muscle, this was sharply reflected from proximal to distal off the calcaneus exposing the anterior calcaneus and the calcaneal cuboid interface. A hintermann retractor was used to expose the joint surface to access the bone fragment. Bone fragment was identified and resected, resection confirmed with c-arm. Bone fragment measured approximately 1 x 0.8 x 0.6 cm, and was sent for pathology.     The surgical incision was irrigated with copious amounts of normal sterile saline. The periosteal and capsular structures were reapproximated using 3-0 vicryl. Subcutaneous closure was obtained utilizing 4-0 vicryl. Skin edges were reapproximated and closure was obtained utilizing 4-0 nylon. The foot was then cleansed and dried. The incision site was dressed with xeroform, gauze. This was then covered with a Leonor and an ACE wrap.     The tourniquet was deflated at approximately 46 min and normal hyperemic response was noted to all digits. The patient tolerated the procedure and anesthesia well without immediate complications and transferred to PACU with vital signs stable.     Dr. Aceves was present during the entire procedure and participated in all key aspects.    SIGNATURE: Tristen Guerrero DPM  DATE: January 9, 2024  TIME: 3:05 PM      Portions of the record may have been created with voice recognition software. Occasional wrong word or \"sound a like\" substitutions may have occurred due to the inherent limitations of voice recognition software. Read the chart " carefully and recognize, using context, where substitutions have occurred.

## 2024-01-12 ENCOUNTER — APPOINTMENT (OUTPATIENT)
Dept: RADIOLOGY | Facility: CLINIC | Age: 19
End: 2024-01-12
Payer: COMMERCIAL

## 2024-01-12 ENCOUNTER — OFFICE VISIT (OUTPATIENT)
Age: 19
End: 2024-01-12

## 2024-01-12 VITALS
HEIGHT: 63 IN | DIASTOLIC BLOOD PRESSURE: 83 MMHG | WEIGHT: 245 LBS | SYSTOLIC BLOOD PRESSURE: 117 MMHG | BODY MASS INDEX: 43.41 KG/M2 | HEART RATE: 90 BPM

## 2024-01-12 DIAGNOSIS — M24.074 LOOSE BODY IN ANKLE AND FOOT JOINT, RIGHT: ICD-10-CM

## 2024-01-12 DIAGNOSIS — M24.071 LOOSE BODY IN ANKLE AND FOOT JOINT, RIGHT: ICD-10-CM

## 2024-01-12 DIAGNOSIS — S92.901K NONUNION OF FOOT FRACTURE, RIGHT: Primary | ICD-10-CM

## 2024-01-12 DIAGNOSIS — Z98.890 POSTOPERATIVE STATE: ICD-10-CM

## 2024-01-12 DIAGNOSIS — S92.901K NONUNION OF FOOT FRACTURE, RIGHT: ICD-10-CM

## 2024-01-12 PROCEDURE — 88311 DECALCIFY TISSUE: CPT | Performed by: PATHOLOGY

## 2024-01-12 PROCEDURE — 88307 TISSUE EXAM BY PATHOLOGIST: CPT | Performed by: PATHOLOGY

## 2024-01-12 PROCEDURE — 73630 X-RAY EXAM OF FOOT: CPT

## 2024-01-12 PROCEDURE — 99024 POSTOP FOLLOW-UP VISIT: CPT | Performed by: STUDENT IN AN ORGANIZED HEALTH CARE EDUCATION/TRAINING PROGRAM

## 2024-01-15 NOTE — PROGRESS NOTES
Post-Op Visit    Jolly Ibanez  2005      Subjective: Patient here for post-op appointment following right foot loose body removal from calcaneocuboid joint secondary to previous nonunion. Patient denies significant pain at the surgical site, active strikethrough drainage, fevers, chills, nightsweats, SOB, chest pain, nor calf pain. The patient is in good spirits.Patient relates compliance with post-op instructions.  Of note, Jolly states that she will be going back to college and states that there is a long walking distance between classes.  We discussed ensuring that her dressing stay intact and that there is not too much pressure to her right foot.    Objective: The patient appears in NAD / non-toxic. Primary dressing and splint/cast taken down for wound inspection.VSS. No signs of infection. No active drainage. Normal post-op edema. No necrosis, dehiscence.     Assessment/Plan:  Patient is stable post-op  Discussed compliance with weight bearing instructions, incision care, and rest.   Sutures left intact  DSD reapplied to be kept clean, dry, intact  Continue weightbearing as tolerated with crutches or knee scooter for assistance    Call if any increase in pain, fevers, calf pain, shortness of breath, or general distress is noted. Patient instructed to go to ER if call is not returned immediately.

## 2024-01-18 ENCOUNTER — TELEPHONE (OUTPATIENT)
Age: 19
End: 2024-01-18

## 2024-01-18 NOTE — TELEPHONE ENCOUNTER
----- Message from Navinisiahjeremías Kalliesuad sent at 1/17/2024  8:01 AM EST -----  Regarding: FW: Percocet Bleeding?  Contact: [phone]    ----- Message -----  From: Jolly Ibanez  Sent: 1/16/2024   8:10 PM EST  To: [facility]  Subject: Percocet Bleeding?                               Gisel Aceves! Thanks for getting back to me about my toes - so weird that the bruising goes up that far. Anyway. This is a little bit of a really weird question and I apologize for that, but my mom won’t leave me alone until I ask it. My period started late yesterday, which is odd because I had it two weeks ago and my cycle is normally pretty regular. I looked it up and [employer] said it could be a side effect of the Percocet, but my mom just wanted me to check in and make sure it’s normal. What do you think?

## 2024-01-26 ENCOUNTER — OFFICE VISIT (OUTPATIENT)
Age: 19
End: 2024-01-26

## 2024-01-26 VITALS
SYSTOLIC BLOOD PRESSURE: 110 MMHG | DIASTOLIC BLOOD PRESSURE: 76 MMHG | WEIGHT: 245 LBS | HEIGHT: 63 IN | BODY MASS INDEX: 43.41 KG/M2 | HEART RATE: 76 BPM

## 2024-01-26 DIAGNOSIS — Z98.890 POSTOPERATIVE STATE: Primary | ICD-10-CM

## 2024-01-26 DIAGNOSIS — M24.074 LOOSE BODY IN ANKLE AND FOOT JOINT, RIGHT: ICD-10-CM

## 2024-01-26 DIAGNOSIS — S92.901K NONUNION OF FOOT FRACTURE, RIGHT: ICD-10-CM

## 2024-01-26 DIAGNOSIS — M24.071 LOOSE BODY IN ANKLE AND FOOT JOINT, RIGHT: ICD-10-CM

## 2024-01-26 PROCEDURE — 99024 POSTOP FOLLOW-UP VISIT: CPT | Performed by: STUDENT IN AN ORGANIZED HEALTH CARE EDUCATION/TRAINING PROGRAM

## 2024-01-27 NOTE — PROGRESS NOTES
Post-Op Visit    Jolly Ibanez  2005      Subjective: Patient here for post-op appointment following right foot loose body removal from calcaneocuboid joint secondary to previous nonunion. Patient denies significant pain at the surgical site, active strikethrough drainage, fevers, chills, nightsweats, SOB, chest pain, nor calf pain. The patient is in good spirits.Patient relates compliance with post-op instructions.    Objective: The patient appears in NAD / non-toxic. Primary dressing and splint/cast taken down for wound inspection.VSS. No signs of infection. No active drainage. Normal post-op edema. No necrosis, dehiscence.     Assessment/Plan:  Patient is stable post-op  Discussed compliance with weight bearing instructions, incision care, and rest.   Jolly may begin ambulating in a supportive pair of sneakers with no boot  Sutures removed at bedside today  Jolly may begin showering and getting her  right foot wet  Return to clinic 4 weeks    Call if any increase in pain, fevers, calf pain, shortness of breath, or general distress is noted. Patient instructed to go to ER if call is not returned immediately.

## 2024-02-23 ENCOUNTER — OFFICE VISIT (OUTPATIENT)
Dept: FAMILY MEDICINE CLINIC | Facility: CLINIC | Age: 19
End: 2024-02-23
Payer: COMMERCIAL

## 2024-02-23 ENCOUNTER — OFFICE VISIT (OUTPATIENT)
Age: 19
End: 2024-02-23

## 2024-02-23 VITALS
RESPIRATION RATE: 16 BRPM | SYSTOLIC BLOOD PRESSURE: 120 MMHG | BODY MASS INDEX: 41.64 KG/M2 | WEIGHT: 235 LBS | HEIGHT: 63 IN | TEMPERATURE: 97.6 F | HEART RATE: 81 BPM | DIASTOLIC BLOOD PRESSURE: 82 MMHG | OXYGEN SATURATION: 99 %

## 2024-02-23 VITALS
RESPIRATION RATE: 18 BRPM | HEIGHT: 63 IN | SYSTOLIC BLOOD PRESSURE: 117 MMHG | WEIGHT: 245 LBS | DIASTOLIC BLOOD PRESSURE: 71 MMHG | HEART RATE: 83 BPM | BODY MASS INDEX: 43.41 KG/M2

## 2024-02-23 DIAGNOSIS — Z98.890 POSTOPERATIVE STATE: Primary | ICD-10-CM

## 2024-02-23 DIAGNOSIS — F39 MOOD DISORDER (HCC): ICD-10-CM

## 2024-02-23 DIAGNOSIS — Z00.00 ANNUAL PHYSICAL EXAM: ICD-10-CM

## 2024-02-23 DIAGNOSIS — G43.111 INTRACTABLE MIGRAINE WITH AURA WITH STATUS MIGRAINOSUS: Primary | ICD-10-CM

## 2024-02-23 DIAGNOSIS — M25.571 CHRONIC PAIN OF RIGHT ANKLE: ICD-10-CM

## 2024-02-23 DIAGNOSIS — M24.071 LOOSE BODY IN ANKLE AND FOOT JOINT, RIGHT: ICD-10-CM

## 2024-02-23 DIAGNOSIS — M24.074 LOOSE BODY IN ANKLE AND FOOT JOINT, RIGHT: ICD-10-CM

## 2024-02-23 DIAGNOSIS — S92.901K NONUNION OF FOOT FRACTURE, RIGHT: ICD-10-CM

## 2024-02-23 DIAGNOSIS — J30.2 SEASONAL ALLERGIES: ICD-10-CM

## 2024-02-23 DIAGNOSIS — Z11.59 NEED FOR HEPATITIS C SCREENING TEST: ICD-10-CM

## 2024-02-23 DIAGNOSIS — F41.1 GENERALIZED ANXIETY DISORDER: ICD-10-CM

## 2024-02-23 DIAGNOSIS — G89.29 CHRONIC PAIN OF RIGHT ANKLE: ICD-10-CM

## 2024-02-23 DIAGNOSIS — M79.671 RIGHT FOOT PAIN: ICD-10-CM

## 2024-02-23 PROBLEM — S93.491A SPRAIN OF ANTERIOR TALOFIBULAR LIGAMENT OF RIGHT ANKLE: Status: RESOLVED | Noted: 2023-08-07 | Resolved: 2024-02-23

## 2024-02-23 PROCEDURE — 99214 OFFICE O/P EST MOD 30 MIN: CPT | Performed by: FAMILY MEDICINE

## 2024-02-23 PROCEDURE — 99024 POSTOP FOLLOW-UP VISIT: CPT | Performed by: STUDENT IN AN ORGANIZED HEALTH CARE EDUCATION/TRAINING PROGRAM

## 2024-02-23 PROCEDURE — 99385 PREV VISIT NEW AGE 18-39: CPT | Performed by: FAMILY MEDICINE

## 2024-02-23 RX ORDER — CITALOPRAM 20 MG/1
20 TABLET ORAL EVERY MORNING
Qty: 90 TABLET | Refills: 2 | Status: SHIPPED | OUTPATIENT
Start: 2024-02-23

## 2024-02-23 NOTE — PROGRESS NOTES
Name: Jolly Ibanez      : 2005      MRN: [id number]  Encounter Provider: Maria De Jesus Antunez MD  Encounter Date: 2024   Encounter department: [facility]    Assessment & Plan     1. Intractable migraine with aura with status migrainosus  Assessment & Plan:  Neurology note reviewed uses decadron prn       2. Generalized anxiety disorder  Assessment & Plan:  Ok on celexa    Orders:  -     citalopram (CeleXA) 20 mg tablet; Take 1 tablet (20 mg total) by mouth every morning    3. Mood disorder (HCC)  Assessment & Plan:  Pt will be looking for  new psychiatrist  will refill  meds now  is seeing therapist      4. Seasonal allergies  Assessment & Plan:  On allegra        5. Right foot pain  Assessment & Plan:  Seeing podiatry      6. Chronic pain of right ankle  Assessment & Plan:  Seeing podiatry      7. Need for hepatitis C screening test  -     Hepatitis C Antibody; Future    8. Annual physical exam  -     Quantiferon TB Gold Plus Assay; Future  -     Hepatitis B surface antibody; Future  -     HEPATITIS B SURFACE AG,QN MONITOR (NOT FOR DIAG); Future  -     CBC and differential; Future  -     Comprehensive metabolic panel; Future; Expected date: 2024  -     Lipid panel; Future; Expected date: 2024  -     TSH, 3rd generation; Future           Subjective      HPI  Review of Systems   Constitutional:  Negative for appetite change, chills, fatigue and fever.   Respiratory:  Negative for cough, chest tightness and shortness of breath.    Cardiovascular:  Negative for chest pain, palpitations and leg swelling.   Gastrointestinal:  Negative for abdominal pain, constipation, diarrhea, nausea and vomiting.   Genitourinary:  Negative for difficulty urinating and frequency.   Musculoskeletal:  Negative for arthralgias, back pain, gait problem and neck pain.   Skin:  Negative for rash.   Neurological:  Negative for dizziness, weakness, light-headedness, numbness and headaches.  "  Hematological:  Does not bruise/bleed easily.   Psychiatric/Behavioral:  Negative for dysphoric mood and sleep disturbance. The patient is not nervous/anxious.        Current Outpatient Medications on File Prior to Visit   Medication Sig   • acetaminophen (TYLENOL) 500 mg tablet Take 500 mg by mouth every 6 (six) hours as needed for mild pain   • dexamethasone (DECADRON) 4 mg tablet Can take 8 mg p.o. with breakfast for 1 to 2 days followed by 4 mg for 1 to 5 days for unrelenting migraine   • drospirenone-ethinyl estradiol (FLIP) 3-0.02 MG per tablet Take 1 tablet by mouth every morning   • fexofenadine (ALLEGRA) 60 MG tablet Take 60 mg by mouth every morning   • guanFACINE HCl ER (Intuniv) 2 MG TB24 Take by mouth every morning   • lamoTRIgine (LaMICtal) 100 mg tablet Take 100 mg by mouth every morning   • Ubrogepant (UBRELVY) 100 MG tablet Take 1 tablet (100 mg) one time as needed for migraine. May repeat one additional tablet (100 mg) at least two hours after the first dose. Do not use more than two doses per day   • [DISCONTINUED] citalopram (CeleXA) 20 mg tablet Take 20 mg by mouth every morning       Objective     /82 (BP Location: Left arm, Patient Position: Sitting, Cuff Size: Large)   Pulse 81   Temp 97.6 °F (36.4 °C) (Tympanic)   Resp 16   Ht 5' 3\" (1.6 m)   Wt 107 kg (235 lb)   SpO2 99%   BMI 41.63 kg/m²     Physical Exam  Vitals reviewed.   Constitutional:       General: She is not in acute distress.     Appearance: Normal appearance. She is well-developed. She is not ill-appearing.   HENT:      Head: Normocephalic.      Right Ear: Tympanic membrane, ear canal and external ear normal.      Left Ear: Tympanic membrane, ear canal and external ear normal.      Nose: Nose normal.      Mouth/Throat:      Mouth: Mucous membranes are moist.      Pharynx: No oropharyngeal exudate.   Eyes:      General: Lids are normal. No scleral icterus.     Extraocular Movements: Extraocular movements intact.      " Conjunctiva/sclera: Conjunctivae normal.      Pupils: Pupils are equal, round, and reactive to light.   Neck:      Thyroid: No thyromegaly.      Vascular: No carotid bruit.   Cardiovascular:      Rate and Rhythm: Normal rate and regular rhythm.      Pulses: Normal pulses.      Heart sounds: Normal heart sounds. No murmur heard.     No friction rub.   Pulmonary:      Effort: Pulmonary effort is normal.      Breath sounds: Normal breath sounds. No wheezing, rhonchi or rales.   Abdominal:      General: Bowel sounds are normal. There is no distension.      Palpations: Abdomen is soft. There is no mass.      Tenderness: There is no abdominal tenderness. There is no guarding.      Hernia: No hernia is present.   Musculoskeletal:         General: Normal range of motion.      Cervical back: Normal range of motion and neck supple.   Lymphadenopathy:      Cervical: No cervical adenopathy.   Skin:     General: Skin is warm and dry.      Findings: No rash.      Comments: No abnormal appearing moles   Neurological:      General: No focal deficit present.      Mental Status: She is alert and oriented to person, place, and time. Mental status is at baseline.      Cranial Nerves: No cranial nerve deficit.      Sensory: No sensory deficit.      Motor: No weakness, tremor or abnormal muscle tone.      Coordination: Coordination normal.      Gait: Gait normal.      Deep Tendon Reflexes: Reflexes normal.   Psychiatric:         Mood and Affect: Mood normal.         Speech: Speech normal.         Behavior: Behavior normal.       Maria De Jesus Antunez MD

## 2024-02-24 NOTE — PROGRESS NOTES
"This patient was seen on  2/23/24 .    My role is Foot , Ankle, and Wound Specialist    ASSESSMENT     Diagnoses and all orders for this visit:    Postoperative state    Nonunion of foot fracture, right    Loose body in ankle and foot joint, right         Problem List Items Addressed This Visit    None  Visit Diagnoses       Postoperative state    -  Primary    Nonunion of foot fracture, right        Loose body in ankle and foot joint, right              PLAN  -Jolly and I discussed her right foot  -Continue weightbearing as tolerated in supportive sneakers with over-the-counter inserts  -No activity limitation at this time  -Return to clinic 3 months for final postoperative visit    SUBJECTIVE    Chief Complaint:  Status post right foot loose body removal from the calcaneocuboid joint secondary to previous nonunion, date of surgery 1/9/2024     Patient ID: Jolly Ibanez     2/23/2024: Jolly is doing well today.  She has been ambulating since her previous visit without a cam boot on with minimal to no pain.  She does still endorse minimal pain with plantarflexion and inversion although states that this is not overall limiting.  She states that overall her right foot feels much better than before her operation.        The following portions of the patient's history were reviewed and updated as appropriate: allergies, current medications, past family history, past medical history, past social history, past surgical history and problem list.    Review of Systems   Constitutional: Negative.    HENT: Negative.     Respiratory: Negative.     Cardiovascular: Negative.    Gastrointestinal: Negative.    Musculoskeletal: Negative.    Skin: Negative.    Neurological: Negative.          OBJECTIVE      /71   Pulse 83   Resp 18   Ht 5' 3\" (1.6 m)   Wt 111 kg (245 lb)   BMI 43.40 kg/m²        Physical Exam  Constitutional:       Appearance: Normal appearance.   HENT:      Head: Normocephalic and atraumatic.   Eyes:      " General:         Right eye: No discharge.         Left eye: No discharge.   Cardiovascular:      Rate and Rhythm: Normal rate and regular rhythm.      Pulses:           Dorsalis pedis pulses are 2+ on the right side and 2+ on the left side.        Posterior tibial pulses are 2+ on the right side and 2+ on the left side.   Pulmonary:      Effort: Pulmonary effort is normal.      Breath sounds: Normal breath sounds.   Skin:     General: Skin is warm.      Capillary Refill: Capillary refill takes less than 2 seconds.   Neurological:      Sensory: Sensation is intact. No sensory deficit.         Vascular:  -DP and PT pulses intact b/l  -Capillary refill time <2 sec b/l  -Digital hair growth: Present  -Skin temp: WNL    MSK:  -Pain on palpation negative.  Pain with max plantarflexion and inversion of the right foot  -No gross deformities noted   -MMT is 5/5 to all muscle compartments of the lower extremity  -Ankle dorsiflexion >10 degrees with knee extended and knee flexed b/l    Derm:  -No lesions, abrasions, or open wounds noted  -No noted interdigital maceration, peeling, malodor  -No callus formation noted on exam

## 2024-03-06 DIAGNOSIS — Z00.00 ANNUAL PHYSICAL EXAM: Primary | ICD-10-CM

## 2024-03-20 DIAGNOSIS — Z78.9 USES BIRTH CONTROL: Primary | ICD-10-CM

## 2024-03-20 RX ORDER — DROSPIRENONE AND ETHINYL ESTRADIOL 0.02-3(28)
1 KIT ORAL EVERY MORNING
Qty: 28 TABLET | Refills: 0 | Status: SHIPPED | OUTPATIENT
Start: 2024-03-20

## 2024-05-09 ENCOUNTER — TELEPHONE (OUTPATIENT)
Dept: NEUROLOGY | Facility: CLINIC | Age: 19
End: 2024-05-09

## 2024-05-09 NOTE — TELEPHONE ENCOUNTER
Called patient and left voicemail to confirm their upcoming appointment with Dr. Huber. Informed patient about arriving in the [place] location 15 minutes prior to appointment to get checked in and go over chart.     
Spine appears normal, range of motion is not limited, mild ttp to right rib cage. right sided facial swelling, ttp on the right temple and over the right and left side of scalp,

## 2024-05-15 ENCOUNTER — APPOINTMENT (EMERGENCY)
Dept: CT IMAGING | Facility: HOSPITAL | Age: 19
End: 2024-05-15
Payer: COMMERCIAL

## 2024-05-15 ENCOUNTER — HOSPITAL ENCOUNTER (EMERGENCY)
Facility: HOSPITAL | Age: 19
Discharge: HOME | End: 2024-05-15
Attending: EMERGENCY MEDICINE
Payer: COMMERCIAL

## 2024-05-15 VITALS
DIASTOLIC BLOOD PRESSURE: 72 MMHG | TEMPERATURE: 98.2 F | OXYGEN SATURATION: 97 % | BODY MASS INDEX: 43.39 KG/M2 | HEART RATE: 74 BPM | WEIGHT: 244.93 LBS | SYSTOLIC BLOOD PRESSURE: 108 MMHG | RESPIRATION RATE: 18 BRPM

## 2024-05-15 DIAGNOSIS — S06.0XAA CONCUSSION: ICD-10-CM
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
## 2024-05-15 PROCEDURE — 70450 CT HEAD/BRAIN W/O DYE: CPT

## 2024-05-15 PROCEDURE — 72125 CT NECK SPINE W/O DYE: CPT

## 2024-05-15 PROCEDURE — 99284 EMERGENCY DEPT VISIT MOD MDM: CPT | Performed by: EMERGENCY MEDICINE

## 2024-05-15 PROCEDURE — 99284 EMERGENCY DEPT VISIT MOD MDM: CPT

## 2024-05-15 NOTE — Clinical Note
Jolly Ibanez was seen and treated in our emergency department on 5/15/2024.    No restrictions            Diagnosis:     Jolly  .    She may return on this date: 05/16/2024         If you have any questions or concerns, please don't hesitate to call.      Tone Fagan, DO    ______________________________           _______________          _______________  Hospital Representative                              Date                                Time
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
## 2024-05-17 ENCOUNTER — TELEMEDICINE (OUTPATIENT)
Dept: NEUROLOGY | Facility: CLINIC | Age: 19
End: 2024-05-17
Payer: COMMERCIAL

## 2024-05-17 DIAGNOSIS — G43.009 MIGRAINE WITHOUT AURA AND WITHOUT STATUS MIGRAINOSUS, NOT INTRACTABLE: Primary | ICD-10-CM

## 2024-05-17 PROCEDURE — 99213 OFFICE O/P EST LOW 20 MIN: CPT | Performed by: PSYCHIATRY & NEUROLOGY

## 2024-05-17 RX ORDER — DEXAMETHASONE 4 MG/1
TABLET ORAL
Qty: 9 TABLET | Refills: 0 | Status: SHIPPED | OUTPATIENT
Start: 2024-05-17

## 2024-05-17 NOTE — PROGRESS NOTES
Review of Systems   Constitutional:  Positive for fatigue. Negative for appetite change and fever.   HENT: Negative.  Negative for hearing loss, tinnitus, trouble swallowing and voice change.    Eyes: Negative.  Negative for photophobia, pain and visual disturbance.   Respiratory: Negative.  Negative for shortness of breath.    Cardiovascular: Negative.  Negative for palpitations.   Gastrointestinal: Negative.  Negative for nausea and vomiting.   Endocrine: Negative.  Negative for cold intolerance.   Genitourinary: Negative.  Negative for dysuria, frequency and urgency.   Musculoskeletal:  Negative for back pain, gait problem, myalgias, neck pain and neck stiffness.   Skin: Negative.  Negative for rash.   Allergic/Immunologic: Negative.    Neurological:  Positive for headaches. Negative for dizziness, tremors, seizures, syncope, facial asymmetry, speech difficulty, weakness, light-headedness and numbness.   Hematological: Negative.  Does not bruise/bleed easily.   Psychiatric/Behavioral: Negative.  Negative for confusion, hallucinations and sleep disturbance.

## 2024-05-17 NOTE — PATIENT INSTRUCTIONS
Headache/migraine treatment:   Abortive medications (for immediate treatment of a headache):   It is ok to take ibuprofen, acetaminophen or naproxen (Advil, Tylenol,  Aleve, Excedrin) if they help your headaches you should limit these to No more than 3 times a week to avoid medication overuse/rebound headaches.     For your more moderate to severe migraines take this medication early   -   Ubrelvy 100 mg at migraine onset and can repeat in 2 hours if needed for max 2 doses in 24 hours and you get 16 tabs a month as there is some likelihood that the more you take this it also helps with prevention    If you have a co-pay that is anything of significance there is a coupon card  @ubrelvy.com to cover this    Back up:  -     dexamethasone (DECADRON) 4 mg tablet; Can take 8 mg p.o. with breakfast for 1 to 2 days followed by 4 mg for 1 to 5 days for unrelenting migraine      Over the counter preventive supplements for headaches/migraines (if you try, try for 3 months straight)   (to take every day to help prevent headaches - not to take at the time of headache):  There are combo pills online of these - none of which regulated by FDA and double check dosing - take appropriate dose only once a day- preventa migraine, migravent, mind ease, migrelief   []  Magnesium 400mg daily (If any diarrhea or upset stomach, decrease dose  as tolerated)  []  Riboflavin (Vitamin B2) 400mg daily - try online   (FYI B2 may make your urine bright/neon yellow)  [] Butter bur     Prescription preventive medications for headaches/migraines   (to take every day to help prevent headaches - not to take at the time of headache):  [x]  We have options if needed/wanted        Lifestyle Recommendations:  [x] SLEEP - Maintain a regular sleep schedule: Adults need at least 7-8 hours of uninterrupted a night. Maintain good sleep hygiene:  Going to bed and waking up at consistent times, avoiding excessive daytime naps, avoiding caffeinated beverages in  the evening, avoid excessive stimulation in the evening and generally using bed primarily for sleeping.  One hour before bedtime would recommend turning lights down lower, decreasing your activity (may read quietly, listen to music at a low volume). When you get into bed, should eliminate all technology (no texting, emailing, playing with your phone, iPad or tablet in bed).  [x] HYDRATION - Maintain good hydration.  Drink  2L of fluid a day (4 typical small water bottles)  [x] DIET - Maintain good nutrition. In particular don't skip meals and try and eat healthy balanced meals regularly.  [x] TRIGGERS - Look for other triggers and avoid them: Limit caffeine to 1-2 cups a day or less. Avoid dietary triggers that you have noticed bring on your headaches (this could include aged cheese, peanuts, MSG, aspartame and nitrates).  [x] EXERCISE - physical exercise as we all know is good for you in many ways, and not only is good for your heart, but also is beneficial for your mental health, cognitive health and  chronic pain/headaches. I would encourage at the least 5 days of physical exercise weekly for at least 30 minutes.     Education and Follow-up  [x] Please call with any questions or concerns. Of course if any new concerning symptoms go to the emergency department.  [x] Follow up 3-4 months, sooner if needed    Certainly if at the time of next visit you are doing wonderfully and no questions or concerns you absolutely could push back to 6 months

## 2024-05-17 NOTE — PROGRESS NOTES
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 schoolwork since the beginning of the semester and has been forgetting a few meals which likely is part of the issue, but she reported she is exercising an hour a day due to walking around campus and sleeping 6 hours per night -I again recommended considering sleep study and referred her over a year ago -and asked if she would like to try prescription preventative medication for headaches    - were bad in Oct and then got a little better and now 2-3 times a week     Preventative:   - None specifically for headaches, headache preventative supplements  - Currently on through other providers:  Lamotrigine 100 mg (never higher), citalopram 20 mg     Abortive: Rizatriptan for migraines - works   Denies bothersome side effects        How likely are you to doze off or fall sleep during the following situations?  0 - would never doze  1 - slight chance of dozing  2 - moderate chance of dozing  3 - high chance of dozing  [place] sleepiness scale  Sitting and reading - 2  Watching TV - 1  Sitting, inactive in a public place such as a theater 1  As a passenger in a car for an hour without a break 2  Lying down to rest in afternoon when circumstances permit 2  Sitting and talking to someone 1  Sitting quietly after lunch without alcohol 1  In a car while stopped for few minutes in traffic - 0        Interval history as of 6/8/2022  - finished  therapies now and feels they helped  - mood is better, continues to follow with therapist and has new Psychiatry and P adjusting medications  - going to college in the [place] [facility] - wants to go into medicine   - still sometimes overwhelmed by overstimulation  - did not follow through with sleep medicine evaluation and I recommended this again to rule out other treatable causes of headaches and other symptoms as I suspect possible sleep apnea    Headaches and migraines   Less frequent and less intense  Still about once a day, about 1-2/10 now, normally can go up to a 5/10, up to 7-8/10  Taken rizatriptan about 3-4 times in 3 months     Preventative:   -  headache preventative supplement - already was on magnesium, B2, added butterbur  - through other providers: restarted citalopram and increase lamotrigine     Abortive: rizatriptan works, sometimes takes a while - up to an hour.   Denies bothersome side effects    Interval history as of 3/4/2022  - denies any new or concerning neurologic symptoms since last visit   - easing into normalcy, stopped wearing hat to prevent light from bothering her, has noticed that she gets more visual snow  - Balance improving, working with PT on fine tuning. No recent falls.   - Has not seen sleep medicine    Headaches and migraines   - headaches less painful than what they used to be. Not taking maxalt as it never been bad enough.   21 headaches in a week. All different. Different triggers such as light or noise.   No new symptoms.     Preventative:    - trial of headache preventative supplement - already was on magnesium, B2, added butterbur  Abortive:   Trial rizatriptan-has not tried as has not had severe migraine    School  [employer], senior  Primary focus painting and art    Mood  - history of  PTSD, anxiety, depression, disruptive mood dysregulation disorder, PMS, PMDD, insomnia  - previously followed with Psychiatry, she was not the right person,  "looking for new provider  - follows with counselor she likes   - through peds - lamictal 75 mg in am  - 2 years, no SE  -  tried reading \"the body keeps the score\" on PTSD and triggers symptoms - okay'd stopping as may not be the right time to delve into that     History as of initial visit 2/1/22:  On 10/15/2021, a metal gate latch fell on the bridge of her nose at school, hitting glasses  Acute symptoms included:  No LOC, no amnesia, laughed at the time, did not feel weird until 20 mins later felt really tired, post traumatic headache right away, lightheaded, 2 hours later that day she fell from her chair and math class   She was evaluated by primary care provider  She was evaluated emergency department 10/18/2021 where she reported dizziness , fatigue, headache, nausea, imbalance.  Noncontrast head CT was unremarkable for acute pathology    She subsequently followed up with sports medicine   - 10/27/2021  - 11/18/2021  - 01/04/2022 - felt 83% back to normal and referred to neuro  She is also followed with optometry, PT and OT - vision therapy started yesterday     - as of 2/1/2022:  She reports gradual improvement of some symptoms still having daily posttraumatic headaches    School   [employer], senior  Primary focus painting and art  - was out of school for a week, then out intermittently sent home early  Back in school full time  accommodations - sometimes wears sunglasses or hats, can leave class early, extra time on assignments  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   - before this once a week, migraines more around menses   - as of 2/1/2022: daily severe headaches initially, now mild daily, moderate 2-3 a week   What time of the day do the headaches start?  Does not often wake up with them Build over the day  How long do the headaches last? Bad day the rest of the day  Are you ever headache free? yes    Aura? without aura     Last eye exam:   Saw optometry fall 2021    Where is your headache located and pain quality?   - frontal - typically bilateral, but can be more on left   - sometimes a pressure, sometimes a , sometimes stabbing  What is the intensity of pain? Average: 2-3/10, worst 7/10  Associated symptoms:   [x] Nausea       [] Vomiting      [x] Photophobia     [x]Phonophobia      [] Osmophobia  [x] Blurred vision    [x] Prefer quiet, dark room  [x] Light-headed or dizzy - feels like she is swaying at times, waxes and wanes     [] Tinnitus   [] Hands or feet tingle or feel numb/paresthesias    [] Ptosis      [] Facial droop  [] Lacrimation  [] Nasal congestion/rhinorrhea        Things that make the headache worse? No specific movements, any shaking movement     Headache triggers:  Menses, stress, driving sometimes    Have you seen someone else for headaches or pain? Yes, Sports med   Have you had trigger point injection performed and how often? No  Have you had Botox injection performed and how often? No   Have you had epidural injections or transforaminal injections performed? No  Are you current pregnant or planning on getting pregnant? Not for years,  on birth control  Have you ever had any Brain imaging? ct    What medications do you take or have you taken for your headaches?   ABORTIVE:    Tylenol 2 pills in am helps    No NSAIDs due to allergy     PREVENTIVE:     Lamotrigine 75 mg daily       Past/ failed/contraindicated:  Fluoxetine  Sertraline  citalopram      Water: 3-4  bottles  per day  Caffeine: 1 cup coffee once a week       The following portions of the patient's history were reviewed and updated as appropriate: allergies, current medications, past family history, past medical history, past social history, past surgical history and problem list.    Pertinent family history:  Family history of headaches:  no known family members with significant headaches  Any family history of aneurysms - Yes - paternal grandmother     Pertinent social history:  Work: 0  Education: HS  Lives with spit time with mom and dad    Past Medical History:   Diagnosis Date    Anxiety     Contact lens/glasses fitting     Depression     Fracture     non-union bone fragment right foot    Head injury 2021    trauma to the head-physical therapy and occupational therapy    Migraine     Piercing     8 total in the ears       Past Surgical History:   Procedure Laterality Date    ANKLE SURGERY Right     RI EXC/CURTG CST/B9 SOWMYA TALUS/CLCNS W/ILIAC/AGRFT Right 1/9/2024    Procedure: 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 with breakfast for 1 to 2 days followed by 4 mg for 1 to 5 days for unrelenting migraine 9 tablet 0    drospirenone-ethinyl estradiol (FLIP) 3-0.02 MG per tablet Take 1 tablet by mouth every morning 28 tablet 0    fexofenadine (ALLEGRA) 60 MG tablet Take 60 mg by mouth every morning      guanFACINE HCl ER (Intuniv) 2 MG TB24 Take by mouth every morning      lamoTRIgine (LaMICtal) 100 mg tablet Take 100 mg by mouth every morning      Ubrogepant (UBRELVY) 100 MG tablet Take 1 tablet (100  mg) one time as needed for migraine. May repeat one additional tablet (100 mg) at least two hours after the first dose. Do not use more than two doses per day 16 tablet 11     No current facility-administered medications for this visit.        Allergies   Allergen Reactions    Ibuprofen Swelling and Anaphylaxis    Dog Epithelium Itching     Itching and hives-cats/dogs         Objective:     Exam limited by Video  Physical Exam:                                                                 Vitals:            Constitutional:    LMP 04/24/2024 (Exact Date)   BP Readings from Last 3 Encounters:   05/15/24 108/72   02/23/24 120/82   02/23/24 117/71     Pulse Readings from Last 3 Encounters:   05/15/24 74   02/23/24 81   02/23/24 83         Well developed, well nourished, No dysmorphic features.       HEENT:  Normocephalic atraumatic. See neuro exam   Psychiatric:  Normal behavior and appropriate affect        Neurological Examination:     Mental status/cognitive function:   Recent and remote memory appear intact. Attention span and concentration as well as fund of knowledge appear appropriate for age. Normal language and spontaneous speech.  VII-facial expression symmetric  Motor Exam: symmetric bulk throughout. no atrophy, fasciculations or abnormal movements noted.   Coordination:  no apparent dysmetria, ataxia or tremor noted      Pertinent lab results:   See EMR for recent labs  - 10/18/2021 CMP and CBC unremarkable  Pregnancy test negative     Imaging:   I have personally reviewed imaging and radiology read   -MRI brain with and without contrast 3/13/2023: Normal MRI of the brain. Mild sinus disease.   *On my retrospective review 6/27/2023 we discussed I see some findings of suspected mild increased intracranial pressure including flattened sella, prominent optic nerve sheaths with mildly tortuous optic nerves and slightly flattened sclera.     - noncontrast head CT 10/18/2021:  No acute intracranial abnormality      Review of Systems:   ROS obtained by medical assistant and reviewed, but if any symptoms listed below say negative, does not mean patient has not had this symptom since last visit, please see HPI for details of symptoms discussed this visit.  Regarding any abnormal or positive findings in ROS that are not neurologic related, patient instructed to address these issues with PCP or go to the ER if they are severe.    Review of Systems   Constitutional:  Positive for fatigue. Negative for appetite change and fever.   HENT: Negative.  Negative for hearing loss, tinnitus, trouble swallowing and voice change.    Eyes: Negative.  Negative for photophobia, pain and visual disturbance.   Respiratory: Negative.  Negative for shortness of breath.    Cardiovascular: Negative.  Negative for palpitations.   Gastrointestinal: Negative.  Negative for nausea and vomiting.   Endocrine: Negative.  Negative for cold intolerance.   Genitourinary: Negative.  Negative for dysuria, frequency and urgency.   Musculoskeletal:  Negative for back pain, gait problem, myalgias, neck pain and neck stiffness.   Skin: Negative.  Negative for rash.   Allergic/Immunologic: Negative.    Neurological:  Positive for headaches. Negative for dizziness, tremors, seizures, syncope, facial asymmetry, speech difficulty, weakness, light-headedness and numbness.   Hematological: Negative.  Does not bruise/bleed easily.   Psychiatric/Behavioral: Negative.  Negative for confusion, hallucinations and sleep disturbance.           I have spent 13 minutes with Patient today in which greater than 50% of this time was spent in counseling/coordination of care regarding Prognosis, Risks and benefits of tx options, Instructions for management, Patient  education, Importance of tx compliance, Risk factor reductions, Impressions, Counseling / Coordination of care, Documenting in the medical record, Obtaining or reviewing history  , and Communicating with other healthcare  professionals . I also spent 8 minutes non face to face for this patient the same day.           Visit Time  Total Visit Duration: 21

## 2024-05-23 ENCOUNTER — TELEPHONE (OUTPATIENT)
Age: 19
End: 2024-05-23

## 2024-05-30 ENCOUNTER — TELEPHONE (OUTPATIENT)
Age: 19
End: 2024-05-30

## 2024-07-09 ENCOUNTER — NURSE TRIAGE (OUTPATIENT)
Dept: OTHER | Facility: OTHER | Age: 19
End: 2024-07-09

## 2024-07-09 NOTE — TELEPHONE ENCOUNTER
"Reason for Disposition  • [1] COVID-19 diagnosed by positive lab test (e.g., PCR, rapid self-test kit) AND [2] mild symptoms (e.g., cough, fever, others) AND [3] no complications or SOB    Answer Assessment - Initial Assessment Questions  1. COVID-19 DIAGNOSIS: \"Who made your COVID-19 diagnosis?\" \"Was it confirmed by a positive lab test or self-test?\" If not diagnosed by a doctor (or NP/PA), ask \"Are there lots of cases (community spread) where you live?\" Note: See public health department website, if unsure.      7/8.   3. ONSET: \"When did the COVID-19 symptoms start?\"       4 days ago   4. WORST SYMPTOM: \"What is your worst symptom?\" (e.g., cough, fever, shortness of breath, muscle aches)      Nasal congestion.   5. COUGH: \"Do you have a cough?\" If Yes, ask: \"How bad is the cough?\"        Wet cough.   6. FEVER: \"Do you have a fever?\" If Yes, ask: \"What is your temperature, how was it measured, and when did it start?\"      No   7. RESPIRATORY STATUS: \"Describe your breathing?\" (e.g., shortness of breath, wheezing, unable to speak)       Normal   8. BETTER-SAME-WORSE: \"Are you getting better, staying the same or getting worse compared to yesterday?\"  If getting worse, ask, \"In what way?\"      Better   9. HIGH RISK DISEASE: \"Do you have any chronic medical problems?\" (e.g., asthma, heart or lung disease, weak immune system, obesity, etc.)      Asthma   12. PREGNANCY: \"Is there any chance you are pregnant?\" \"When was your last menstrual period?\"        No, LMP 6/15  13. OTHER SYMPTOMS: \"Do you have any other symptoms?\"  (e.g., chills, fatigue, headache, loss of smell or taste, muscle pain, sore throat)        Nasal congestion, headache.   14. O2 SATURATION MONITOR:  \"Do you use an oxygen saturation monitor (pulse oximeter) at home?\" If Yes, ask \"What is your reading (oxygen level) today?\" \"What is your usual oxygen saturation reading?\" (e.g., 95%)        No.    Protocols used: Coronavirus (COVID-19) Diagnosed or " Suspected-ADULT-[place]

## 2024-07-09 NOTE — TELEPHONE ENCOUNTER
Advise pt that she quarantines for 5 days from onset of symptoms; then mask for 5 days when with other people

## 2024-07-09 NOTE — TELEPHONE ENCOUNTER
Regarding: Covid  ----- Message from Bev VICENTE sent at 7/9/2024 10:00 AM EDT -----  Hi Dr. Antunez! I just tested positive for COVID after [place]. I started feeling sick ~2-3 days ago but was only able to test just now. What do I do? I'm having trouble making sense of the information on the internet as to how to move forward. Should I be quarantining? If so, for how long?    Jolly molina  Primary Care [facility] (supporting Maria De Jesus Antunez MD)         7/8/24 10:52 PM  I don't have a fever and I don't think I ever did, but I do have face and chest congestion, runny nose, sneezing, and some weakness

## 2024-07-15 ENCOUNTER — OFFICE VISIT (OUTPATIENT)
Age: 19
End: 2024-07-15
Payer: COMMERCIAL

## 2024-07-15 VITALS
BODY MASS INDEX: 43.23 KG/M2 | HEART RATE: 79 BPM | SYSTOLIC BLOOD PRESSURE: 103 MMHG | HEIGHT: 63 IN | DIASTOLIC BLOOD PRESSURE: 71 MMHG | WEIGHT: 244 LBS

## 2024-07-15 DIAGNOSIS — Z98.890 POSTOPERATIVE STATE: Primary | ICD-10-CM

## 2024-07-15 PROCEDURE — 99212 OFFICE O/P EST SF 10 MIN: CPT | Performed by: STUDENT IN AN ORGANIZED HEALTH CARE EDUCATION/TRAINING PROGRAM

## 2024-07-15 NOTE — PROGRESS NOTES
"This patient was seen on  7/15/24 .    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  She denies any pain to her right foot with activity or at rest.        The following portions of the patient's history were reviewed and updated as appropriate: allergies, current medications, past family history, past medical history, past social history, past surgical history and problem list.    Review of Systems   Constitutional: Negative.    HENT: Negative.     Respiratory: Negative.     Cardiovascular: Negative.    Gastrointestinal: Negative.    Musculoskeletal: Negative.    Skin: Negative.    Neurological: Negative.          OBJECTIVE      /71   Pulse 79   Ht 5' 3\" (1.6 m)   Wt 111 kg (244 lb)   BMI 43.22 kg/m²        Physical Exam  Constitutional:       Appearance: Normal appearance.   HENT:      Head: Normocephalic and atraumatic.   Eyes:      General:         Right eye: No discharge.         Left eye: No discharge.   Cardiovascular:      Rate and Rhythm: Normal rate and regular rhythm.      Pulses:           Dorsalis pedis pulses are 2+ on the right side and 2+ on the left side.        Posterior tibial pulses are 2+ on the right side and 2+ on the left side.   Pulmonary:      Effort: Pulmonary effort is normal.      Breath sounds: " Normal breath sounds.   Skin:     General: Skin is warm.      Capillary Refill: Capillary refill takes less than 2 seconds.   Neurological:      Sensory: Sensation is intact. No sensory deficit.         Vascular:  -DP and PT pulses intact b/l  -Capillary refill time <2 sec b/l  -Digital hair growth: Present  -Skin temp: WNL    MSK:  -Pain on palpation negative.    -No positional pain noted at today's visit  -No gross deformities noted   -MMT is 5/5 to all muscle compartments of the lower extremity  -Ankle dorsiflexion >10 degrees with knee extended and knee flexed b/l    Derm:  -No lesions, abrasions, or open wounds noted  -No noted interdigital maceration, peeling, malodor  -No callus formation noted on exam

## 2024-08-16 ENCOUNTER — TELEPHONE (OUTPATIENT)
Age: 19
End: 2024-08-16

## 2024-08-20 ENCOUNTER — OFFICE VISIT (OUTPATIENT)
Dept: FAMILY MEDICINE CLINIC | Facility: CLINIC | Age: 19
End: 2024-08-20
Payer: COMMERCIAL

## 2024-08-20 VITALS
RESPIRATION RATE: 16 BRPM | BODY MASS INDEX: 43.41 KG/M2 | HEART RATE: 88 BPM | OXYGEN SATURATION: 98 % | DIASTOLIC BLOOD PRESSURE: 60 MMHG | HEIGHT: 63 IN | SYSTOLIC BLOOD PRESSURE: 100 MMHG | WEIGHT: 245 LBS | TEMPERATURE: 97.6 F

## 2024-08-20 DIAGNOSIS — G56.01 RIGHT CARPAL TUNNEL SYNDROME: Primary | ICD-10-CM

## 2024-08-20 PROCEDURE — 99213 OFFICE O/P EST LOW 20 MIN: CPT | Performed by: FAMILY MEDICINE

## 2024-08-20 NOTE — PROGRESS NOTES
Subjective:      Patient ID: Jolly Ibanez is a 19 y.o. female.    Right wrist and index finger pain and intermittent numbness    Hand Pain   Pertinent negatives include no chest pain.       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 She has never used smokeless tobacco. She reports that she does not currently use alcohol. She reports that she does not use drugs.      Current Outpatient Medications:     acetaminophen (TYLENOL) 500 mg tablet, Take 500 mg by mouth every 6 (six) hours as needed for mild pain, Disp: , Rfl:     citalopram (CeleXA) 20 mg tablet, Take 1 tablet (20 mg total) by mouth every morning, Disp: 90 tablet, Rfl: 2    dexamethasone (DECADRON) 4 mg tablet, Can take 8 mg p.o. with breakfast for 1 to 2 days followed by 4 mg for 1 to 5 days for unrelenting migraine, Disp: 9 tablet, Rfl: 0    drospirenone-ethinyl estradiol (FLIP) 3-0.02 MG per tablet, Take 1 tablet by mouth every morning, Disp: 28 tablet, Rfl: 0    fexofenadine (ALLEGRA) 60 MG tablet, Take 60 mg by mouth every morning, Disp: , Rfl:     guanFACINE HCl ER (Intuniv) 2 MG TB24, Take by mouth every morning, Disp: , Rfl:     lamoTRIgine (LaMICtal) 100 mg tablet, Take 100 mg by mouth every morning,  "Disp: , Rfl:     Ubrogepant (UBRELVY) 100 MG tablet, Take 1 tablet (100 mg) one time as needed for migraine. May repeat one additional tablet (100 mg) at least two hours after the first dose. Do not use more than two doses per day, Disp: 16 tablet, Rfl: 11    The following portions of the patient's history were reviewed and updated as appropriate: allergies, current medications, past family history, past medical history, past social history, past surgical history and problem list.    Review of Systems   Constitutional:  Negative for fatigue and fever.   HENT:  Negative for congestion, facial swelling, mouth sores, rhinorrhea, sore throat and trouble swallowing.    Eyes:  Negative for pain and redness.   Respiratory:  Negative for cough, shortness of breath and wheezing.    Cardiovascular:  Negative for chest pain, palpitations and leg swelling.   Gastrointestinal:  Negative for abdominal pain, blood in stool, constipation, diarrhea and nausea.   Genitourinary:  Negative for dysuria, hematuria and urgency.   Musculoskeletal:  Positive for arthralgias. Negative for back pain and myalgias.   Skin:  Negative for rash and wound.   Neurological:  Negative for seizures, syncope and headaches.   Hematological:  Negative for adenopathy.   Psychiatric/Behavioral:  Negative for agitation and behavioral problems.          PHQ-2/9 Depression Screening    Little interest or pleasure in doing things: 0 - not at all  Feeling down, depressed, or hopeless: 0 - not at all  PHQ-2 Score: 0  PHQ-2 Interpretation: Negative depression screen             Objective:    /60 (BP Location: Right arm, Patient Position: Sitting, Cuff Size: Large)   Pulse 88   Temp 97.6 °F (36.4 °C) (Tympanic)   Resp 16   Ht 5' 3.25\" (1.607 m)   Wt 111 kg (245 lb)   SpO2 98%   BMI 43.06 kg/m²      Physical Exam  Vitals and nursing note reviewed.   Constitutional:       Appearance: She is well-developed.   HENT:      Head: Normocephalic and atraumatic. "      Right Ear: External ear normal.      Left Ear: External ear normal.      Nose: Nose normal.   Eyes:      General: No scleral icterus.        Right eye: No discharge.         Left eye: No discharge.      Conjunctiva/sclera: Conjunctivae normal.      Comments: Visual observation   Pulmonary:      Effort: No respiratory distress.   Musculoskeletal:         General: Tenderness present. No deformity.      Comments: Finkelstein -negative  Phalens+   Neurological:      Mental Status: She is alert. Mental status is at baseline.   Psychiatric:         Behavior: Behavior normal.         Judgment: Judgment normal.           Recent Results (from the past 8736 hour(s))   Urine Pregnancy (Holding Area Only) POCT    Collection Time: 01/09/24  9:47 AM   Result Value Ref Range    EXT Preg Test, Ur Negative Negative    Control Valid Valid   ECG 12 lead    Collection Time: 01/09/24 10:02 AM   Result Value Ref Range    Ventricular Rate 75 BPM    Atrial Rate 75 BPM    LA Interval 142 ms    QRSD Interval 90 ms    QT Interval 402 ms    QTC Interval 448 ms    P [place] 29 degrees    QRS Axis 51 degrees    T Wave Axis 30 degrees   ECG 12 lead    Collection Time: 01/09/24 10:03 AM   Result Value Ref Range    Ventricular Rate 71 BPM    Atrial Rate 71 BPM    LA Interval 146 ms    QRSD Interval 86 ms    QT Interval 408 ms    QTC Interval 443 ms    P [place] 33 degrees    QRS Axis 54 degrees    T Wave Axis 37 degrees   Tissue Exam    Collection Time: 01/09/24  2:11 PM   Result Value Ref Range    Case Report       Surgical Pathology Report                         Case: [id number]                                  Authorizing Provider:  Severiano Aceves DPM         Collected:           01/09/2024 1411              Ordering Location:     [facility] Received:            01/09/2024 1642                                     Operating Room                                                               Pathologist:           Lavonne Stoddard  "MD Lisa                                                       Specimen:    Bone, loose bone fragment right calcaneocuboid foot                                        Final Diagnosis       A. Bone, loose bone fragment right calcaneocuboid foot:  -Benign trabeculated bone and overlying degenerative cartilage.  -Serous atrophy of bone marrow.  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  These tests were developed and their performance characteristics determined by [facility] or appropriate performing facility, though some tests may be performed on tissues which have not been validated for performance characteristics (such as staining performed on alcohol exposed cell blocks and decalcified tissues).  Results should be interpreted with caution and in the context of the patients’ clinical condition. These tests may not be cleared or approved by the U.S. Food and Drug Administration, though the FDA has determined that such clearance or approval is not necessary. These tests are used for clinical purposes and they should not be regarded as investigational or for research. This laboratory has been approved by CLIA 88, designated as a high-complexity laboratory and is qualified to perform these tests.  .      Gross Description       A. The specimen is received in formalin, labeled with the patient's name and hospital number, and is designated \" right " "calcaneocuboid foot\".  The specimen consists of 1 white-tan portion of osseous tissue which measures 1.0 x 0.8 x 0.7 cm.  Entirely submitted. One cassette, bisected after decalcification in decal II.    Note: The estimated total formalin fixation time based upon information provided by the submitting clinician and the standard processing schedule is under 72 hours.  MSequino         Laboratory Results: I have personally reviewed the pertinent laboratory results/reports     Radiology/Other Diagnostic Testing Results: I have personally reviewed pertinent reports.      CT spine cervical without contrast    Result Date: 5/15/2024  CT CERVICAL SPINE - WITHOUT CONTRAST INDICATION:   rear-ended, midline spinal tenderness. COMPARISON:  None. TECHNIQUE:  CT examination of the cervical spine was performed without intravenous contrast.  Contiguous axial images were obtained. Multiplanar 2D reformatted images were created from the source data. Radiation dose length product (DLP) for this visit:  357 mGy-cm .  This examination, like all CT scans performed in the [facility], was performed utilizing techniques to minimize radiation dose exposure, including the use of iterative reconstruction and automated exposure control. IMAGE QUALITY:  Diagnostic. FINDINGS: ALIGNMENT:  There is straightening of normal cervical lordosis.  No subluxation or compression deformity. VERTEBRAE:  No fracture. DEGENERATIVE CHANGES:  No significant cervical degenerative changes are noted. PREVERTEBRAL AND PARASPINAL SOFT TISSUES: Unremarkable THORACIC INLET:  Normal.     1.  No cervical spine fracture 2.  There is straightening of normal cervical lordosis.  No subluxation or compression deformity. Workstation performed: [id number]     CT head without contrast    Result Date: 5/15/2024  CT BRAIN - WITHOUT CONTRAST INDICATION:   rear-ended, 5 min decreased responsiveness. COMPARISON: 10/18/2021 TECHNIQUE:  CT examination of the brain was " "performed.  Multiplanar 2D reformatted images were created from the source data. Radiation dose length product (DLP) for this visit:  1022 mGy-cm .  This examination, like all CT scans performed in the [facility], was performed utilizing techniques to minimize radiation dose exposure, including the use of iterative reconstruction and automated exposure control. IMAGE QUALITY:  Diagnostic. FINDINGS: PARENCHYMA:  No intracranial mass, mass effect or midline shift. No CT signs of acute infarction.  No acute parenchymal hemorrhage. VENTRICLES AND EXTRA-AXIAL SPACES:  Normal for the patient's age. VISUALIZED ORBITS: Normal visualized orbits. PARANASAL SINUSES: Normal visualized paranasal sinuses. CALVARIUM AND EXTRACRANIAL SOFT TISSUES:  Normal.     No intracranial hemorrhage or calvarial fracture. Workstation performed: [id number]        Assessment/Plan:  1. Right carpal tunnel syndrome  -     Ambulatory Referral to Orthopedic Surgery; Future        Recommend night time wrist splints, given handout for carpal tunnel stretches and exercises. If not improved-recommend follow up with hand ortho for CSI/release  Occasionally cervical radiculopathy can cause symptoms like this however symptoms are currently consistent with right carpal tunnel syndrome           Read package inserts for all medications before starting a new medications, call me if you have any questions.    Patient was given opportunity to ask questions and all questions were answered.    Disclaimer: Portions of the record may have been created with voice recognition software. Occasional wrong word or \"sound a like\" substitutions may have occurred due to the inherent limitations of voice recognition software. Read the chart carefully and recognize, using context, where substitutions have occurred. I have used the Epic copy/forward function to compose this note. I have reviewed my current note to ensure it reflects the current patient status, " exam, assessment and plan.

## 2024-08-26 ENCOUNTER — TELEPHONE (OUTPATIENT)
Age: 19
End: 2024-08-26

## 2024-08-26 ENCOUNTER — TELEMEDICINE (OUTPATIENT)
Dept: NEUROLOGY | Facility: CLINIC | Age: 19
End: 2024-08-26
Payer: COMMERCIAL

## 2024-08-26 ENCOUNTER — TELEPHONE (OUTPATIENT)
Dept: NEUROLOGY | Facility: CLINIC | Age: 19
End: 2024-08-26

## 2024-08-26 VITALS — HEIGHT: 63 IN | WEIGHT: 245 LBS | BODY MASS INDEX: 43.41 KG/M2

## 2024-08-26 DIAGNOSIS — G43.009 MIGRAINE WITHOUT AURA AND WITHOUT STATUS MIGRAINOSUS, NOT INTRACTABLE: Primary | ICD-10-CM

## 2024-08-26 PROCEDURE — 99213 OFFICE O/P EST LOW 20 MIN: CPT | Performed by: PSYCHIATRY & NEUROLOGY

## 2024-08-26 NOTE — PROGRESS NOTES
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 past out, 1 hydroflask water a day   - started feeling off balance while walking to dinner with friend, lightheaded, ate at dinner, went back to dorm to study and making flashcards and two hours later started feeling like she could barely keep head up and friends left room for a moment and was slumped over and barely continuous and sitting at a desk and they helped her up and she slumped back down to chair and then they got RA. They eased her to the ground and called school EMS and they helped her out and turned to her side. While on the ground whole body shaking and would last 1-1.5 mins and then would start again and relaxed in hospital and she felt out of it.   No urinary or bowel incontinence, no tongue biting, no history of seizures  In ED around 830/9 pm, took vitals abd blood work and came back normal and she says she couldn't really talk to them, was staring but couldn't really see them, responses were brief  - This morning was up and about with dad studying and eyes started to roll and he told her to control her breathing and everything got better.     Sleep  - not good per dad, tries to go bed by 12 and 647, broken    -I again recommended considering sleep study and referred her over a year ago    Headaches and migraines   -10/14/2022 wrote me regarding an uptake in the number of headaches per week  with more intense schoolwork since the beginning of the semester and has been forgetting a few meals which likely is part of the issue, but she reported she is exercising an hour a day due to walking around campus and sleeping 6 hours per night -I again recommended considering sleep study and referred her over a year ago -and asked if she would like to try prescription preventative medication for headaches    - were bad in Oct and then got a little better and now 2-3 times a week     Preventative:   - None specifically for headaches, headache preventative supplements  - Currently on through other providers:  Lamotrigine 100 mg (never higher), citalopram 20 mg     Abortive: Rizatriptan for migraines - works   Denies bothersome side effects        How likely are you to doze off or fall sleep during the following situations?  0 - would never doze  1 - slight chance of dozing  2 - moderate chance of dozing  3 - high chance of dozing  [place] sleepiness scale  Sitting and reading - 2  Watching TV - 1  Sitting, inactive in a public place such as a theater 1  As a passenger in a car for an hour without a break 2  Lying down to rest in afternoon when circumstances permit 2  Sitting and talking to someone 1  Sitting quietly after lunch without alcohol 1  In a car while stopped for few minutes in traffic - 0        Interval history as of 6/8/2022  - finished therapies now and feels they helped  - mood is better, continues to follow with therapist and has new Psychiatry and P adjusting medications  - going to college in [employer] - wants to go into medicine   - still sometimes overwhelmed by overstimulation  - did not follow through with sleep medicine evaluation and I recommended this again to rule out other treatable causes of headaches and other symptoms as I suspect possible sleep apnea    Headaches and migraines   Less frequent and less intense  Still about once a day, about 1-2/10 now, normally can go up to a  "5/10, up to 7-8/10  Taken rizatriptan about 3-4 times in 3 months     Preventative:   -  headache preventative supplement - already was on magnesium, B2, added butterbur  - through other providers: restarted citalopram and increase lamotrigine     Abortive: rizatriptan works, sometimes takes a while - up to an hour.   Denies bothersome side effects    Interval history as of 3/4/2022  - denies any new or concerning neurologic symptoms since last visit   - easing into normalcy, stopped wearing hat to prevent light from bothering her, has noticed that she gets more visual snow  - Balance improving, working with PT on fine tuning. No recent falls.   - Has not seen sleep medicine    Headaches and migraines   - headaches less painful than what they used to be. Not taking maxalt as it never been bad enough.   21 headaches in a week. All different. Different triggers such as light or noise.   No new symptoms.     Preventative:    - trial of headache preventative supplement - already was on magnesium, B2, added butterbur  Abortive:   Trial rizatriptan-has not tried as has not had severe migraine    School  [employer], senior  Primary focus painting and art    Mood  - history of  PTSD, anxiety, depression, disruptive mood dysregulation disorder, PMS, PMDD, insomnia  - previously followed with Psychiatry, she was not the right person, looking for new provider  - follows with counselor she likes   - through peds - lamictal 75 mg in am  - 2 years, no SE  -  tried reading \"the body keeps the score\" on PTSD and triggers symptoms - okay'd stopping as may not be the right time to delve into that     History as of initial visit 2/1/22:  On 10/15/2021, a metal gate latch fell on the bridge of her nose at school, hitting glasses  Acute symptoms included:  No LOC, no amnesia, laughed at the time, did not feel weird until 20 mins later felt really tired, post traumatic headache right away, lightheaded, 2 hours later " "that day she fell from her chair and math class   She was evaluated by primary care provider  She was evaluated emergency department 10/18/2021 where she reported dizziness , fatigue, headache, nausea, imbalance.  Noncontrast head CT was unremarkable for acute pathology    She subsequently followed up with sports medicine   - 10/27/2021  - 11/18/2021  - 01/04/2022 - felt 83% back to normal and referred to neuro  She is also followed with optometry, PT and OT - vision therapy started yesterday     - as of 2/1/2022:  She reports gradual improvement of some symptoms still having daily posttraumatic headaches    [employer], senior  Primary focus painting and art  - was out of school for a week, then out intermittently sent home early  Back in school full time  accommodations - sometimes wears sunglasses or hats, can leave class early, extra time on assignments  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   - before this once a week, migraines more around menses   - as of 2/1/2022: daily severe headaches initially, now mild daily, moderate 2-3 a week   What time of the day do the headaches start?  Does not often wake up with them Build over the day  How long do the headaches last? Bad day the rest of the day  Are you ever headache free? yes    Aura? without aura     Last eye exam:   Saw optometry fall 2021    Where is your headache located and pain quality?   - frontal - typically bilateral, " but can be more on left   - sometimes a pressure, sometimes a , sometimes stabbing  What is the intensity of pain? Average: 2-3/10, worst 7/10  Associated symptoms:   [x] Nausea       [] Vomiting      [x] Photophobia     [x]Phonophobia      [] Osmophobia  [x] Blurred vision    [x] Prefer quiet, dark room  [x] Light-headed or dizzy - feels like she is swaying at times, waxes and wanes     [] Tinnitus   [] Hands or feet tingle or feel numb/paresthesias    [] Ptosis      [] Facial droop  [] Lacrimation  [] Nasal congestion/rhinorrhea        Things that make the headache worse? No specific movements, any shaking movement     Headache triggers:  Menses, stress, driving sometimes    Have you seen someone else for headaches or pain? Yes, Sports med   Have you had trigger point injection performed and how often? No  Have you had Botox injection performed and how often? No   Have you had epidural injections or transforaminal injections performed? No  Are you current pregnant or planning on getting pregnant? Not for years,  on birth control  Have you ever had any Brain imaging? ct    What medications do you take or have you taken for your headaches?   ABORTIVE:    Tylenol 2 pills in am helps    No NSAIDs due to allergy     PREVENTIVE:     Lamotrigine 75 mg daily       Past/ failed/contraindicated:  Fluoxetine  Sertraline  citalopram      Water: 3-4 bottles  per day  Caffeine: 1 cup coffee once a week       The following portions of the patient's history were reviewed and updated as appropriate: allergies, current medications, past family history, past medical history, past social history, past surgical history and problem list.    Pertinent family history:  Family history of headaches:  no known family members with significant headaches  Any family history of aneurysms - Yes - paternal grandmother     Pertinent social history:  Work: 0  Education: HS  Lives with spit time with mom and dad    Pertinent lab results:   See  "EMR for recent labs  - 10/18/2021 CMP and CBC unremarkable  Pregnancy test negative     Imaging:   I have personally reviewed imaging and radiology read   -MRI brain with and without contrast 3/13/2023: Normal MRI of the brain. Mild sinus disease.   *On my retrospective review 6/27/2023 we discussed I see some findings of suspected mild increased intracranial pressure including flattened sella, prominent optic nerve sheaths with mildly tortuous optic nerves and slightly flattened sclera.     - noncontrast head CT 10/18/2021:  No acute intracranial abnormality        Objective     Ht 5' 3\" (1.6 m)   Wt 111 kg (245 lb)   BMI 43.40 kg/m²     Objective:     Exam limited by Video  Physical Exam:                                                                 Vitals:            Constitutional:    Ht 5' 3\" (1.6 m)   Wt 111 kg (245 lb)   BMI 43.40 kg/m²   BP Readings from Last 3 Encounters:   08/20/24 100/60   07/15/24 103/71   05/15/24 108/72     Pulse Readings from Last 3 Encounters:   08/20/24 88   07/15/24 79   05/15/24 74         Well developed, well nourished, No dysmorphic features.         Normocephalic atraumatic.     Normal behavior and appropriate affect        Able to answer questions appropriately, provide history of recent events   Normal language and spontaneous speech.  facial expression symmetric  symmetric bulk throughout. no atrophy, fasciculations or significant abnormal movements noted during our visit from observation.        Review of Systems:   ROS obtained by medical assistant and reviewed, but if any symptoms listed below say negative, does not mean patient has not had this symptom since last visit, please see HPI for details of symptoms discussed this visit.  Regarding any abnormal or positive findings in ROS that are not neurologic related, patient instructed to address these issues with PCP or go to the ER if they are severe.      Review of Systems   Constitutional:  Negative for appetite " change, fatigue and fever.   HENT: Negative.  Negative for hearing loss, tinnitus, trouble swallowing and voice change.    Eyes: Negative.  Negative for photophobia, pain and visual disturbance.   Respiratory: Negative.  Negative for shortness of breath.    Cardiovascular: Negative.  Negative for palpitations.   Gastrointestinal: Negative.  Negative for nausea and vomiting.   Endocrine: Negative.  Negative for cold intolerance.   Genitourinary: Negative.  Negative for dysuria, frequency and urgency.   Musculoskeletal:  Negative for back pain, gait problem, myalgias, neck pain and neck stiffness.   Skin: Negative.  Negative for rash.   Allergic/Immunologic: Negative.    Neurological:  Positive for headaches (since last ov her ha are better it comes once a week.). Negative for dizziness, tremors, seizures, syncope, facial asymmetry, speech difficulty, weakness, light-headedness and numbness.   Hematological: Negative.  Does not bruise/bleed easily.   Psychiatric/Behavioral: Negative.  Negative for confusion, hallucinations and sleep disturbance.    All other systems reviewed and are negative.           I have spent 16 minutes with Patient today in which greater than 50% of this time was spent in counseling/coordination of care regarding Prognosis, Risks and benefits of tx options, Instructions for management, Patient and family education, Importance of tx compliance, Risk factor reductions, Impressions, Counseling / Coordination of care, Documenting in the medical record, Obtaining or reviewing history  , and Communicating with other healthcare professionals . I also spent 7 minutes non face to face for this patient the same day.           Visit Time  Total Visit Duration: 23

## 2024-08-26 NOTE — TELEPHONE ENCOUNTER
Called patient to get her F/U scheduled with Dr su in 6 mon patient was unable to answer the phone, left patient a vm with a call back number.

## 2024-08-26 NOTE — PROGRESS NOTES
Review of Systems   Constitutional:  Negative for appetite change, fatigue and fever.   HENT: Negative.  Negative for hearing loss, tinnitus, trouble swallowing and voice change.    Eyes: Negative.  Negative for photophobia, pain and visual disturbance.   Respiratory: Negative.  Negative for shortness of breath.    Cardiovascular: Negative.  Negative for palpitations.   Gastrointestinal: Negative.  Negative for nausea and vomiting.   Endocrine: Negative.  Negative for cold intolerance.   Genitourinary: Negative.  Negative for dysuria, frequency and urgency.   Musculoskeletal:  Negative for back pain, gait problem, myalgias, neck pain and neck stiffness.   Skin: Negative.  Negative for rash.   Allergic/Immunologic: Negative.    Neurological:  Positive for headaches (since last ov her ha are better it comes once a week.). Negative for dizziness, tremors, seizures, syncope, facial asymmetry, speech difficulty, weakness, light-headedness and numbness.   Hematological: Negative.  Does not bruise/bleed easily.   Psychiatric/Behavioral: Negative.  Negative for confusion, hallucinations and sleep disturbance.    All other systems reviewed and are negative.

## 2024-08-26 NOTE — TELEPHONE ENCOUNTER
----- Message from Yesi Huber MD sent at 8/26/2024 11:20 AM EDT -----  Could you please help with prior authorization for Ubrelvy which expires 9/24/2024 thank you

## 2024-09-13 NOTE — TELEPHONE ENCOUNTER
PA initiated on CMM. (Key: [id number])     Awaiting determination    If you have any questions about your PA submission, contact [employer] at [phone].

## 2024-09-19 NOTE — TELEPHONE ENCOUNTER
PA approved through 9/13/25    Called [facility] and left a detailed message on their answering machine making them aware of the approval and for a call back if any questions

## 2024-09-24 DIAGNOSIS — G43.009 MIGRAINE WITHOUT AURA AND WITHOUT STATUS MIGRAINOSUS, NOT INTRACTABLE: ICD-10-CM

## 2024-09-24 RX ORDER — DEXAMETHASONE 4 MG/1
TABLET ORAL
Qty: 9 TABLET | Refills: 0 | Status: SHIPPED | OUTPATIENT
Start: 2024-09-24

## 2024-10-28 NOTE — PATIENT COMMUNICATION
Hospitalist Discharge Summary     Patient ID:  Timur Ley  [id number]  89 y.o.  1/31/1928    Admit date: 10/22/2024    Discharge date and time: 10/28/2024    Admission Diagnoses: Shortness of breath [R06.02]  Chronic congestive heart failure, unspecified heart failure type (HCC) [I50.9]    Discharge Diagnoses:    Principal Problem:    Shortness of breath  Active Problems:    Chronic congestive heart failure (HCC)  Resolved Problems:    * No resolved hospital problems. *         Hospital Course:         89-year-old gentleman who has previous history significant for chronic atrial fibrillation, status post pacemaker insertion, chronic obstructive pulmonary disease, is admitted due to shortness of breath and weakness     AHRF 2/2 Acute HFrEF: POA. Acute. EF of 20-25% on ECHO  -s/p IV diuresis; transition to PO Lasix BID at discharge  -plans for eventual discharge to [facility]   -Metoprolol was held due to hypotension.  Can resume it at discharge if BP allows  -BP unlikely to tolerate ARNI     CKD stage III  -stable with diuresis; monitor with transition to PO lasix BID      Afib: Chronic  -INR supratherapeutic today 3.7  -Discussed with pharmacy, hold Coumadin for the next 2 days.  Recheck INR within 2 to 3 days.  If INR comes therapeutic ( 2-3 range), can resume warfarin 1 mg daily.  -Can resume beta-blocker at discharge if BP can tolerate.     Mild chronic anemia: POA  -FA low; continue with folic acid supplementation  -B12 WNL     COPD: acute on chronic  -Received IV steroids and doxycycline.  Received bronchodilators-  -short course of steroids and antibiotics at discharge.     HTN: POA. Chronic  -Monitor BP closely     History of recent kyphoplasty   -PT evaluation     CT chest incidentals: atelectasis, effusion, emphysema  -Received diuresis, steroids, bronchodilators  -Continue oxygen at discharge     Hyperglycemia   -A1c 6.4. This is reasonable and would not start meds considering age, goals of care  November 16, 2023  [place], [place]   to MD Janice Torrez [facility]    11/16/23  3:14 PM  Called patient and I moved up appointment to 01/05/24

## 2024-12-03 ENCOUNTER — PATIENT MESSAGE (OUTPATIENT)
Dept: NEUROLOGY | Facility: CLINIC | Age: 19
End: 2024-12-03

## 2024-12-06 NOTE — PATIENT COMMUNICATION
Yesi Huber MD to Neurology Concussion & Migraine Team 5       12/5/24  5:20 PM   Looks to me like epilepsy team did not generate the letter? Could you please help?

## 2025-01-20 ENCOUNTER — TELEPHONE (OUTPATIENT)
Age: 20
End: 2025-01-20

## 2025-01-20 NOTE — TELEPHONE ENCOUNTER
Jolly called in regards to her lab order Quantiferon TB Gold Plus Assay   Patient is requesting for this lab to be sent to [facility] at [address], [place] [address].   Phone number [phone]  I was unable to identify the fax number.    Please advise and contact patient.

## 2025-03-19 ENCOUNTER — TELEMEDICINE (OUTPATIENT)
Dept: NEUROLOGY | Facility: CLINIC | Age: 20
End: 2025-03-19
Payer: COMMERCIAL

## 2025-03-19 DIAGNOSIS — G43.009 MIGRAINE WITHOUT AURA AND WITHOUT STATUS MIGRAINOSUS, NOT INTRACTABLE: Primary | ICD-10-CM

## 2025-03-19 PROCEDURE — 99214 OFFICE O/P EST MOD 30 MIN: CPT | Performed by: PSYCHIATRY & NEUROLOGY

## 2025-03-19 RX ORDER — GUANFACINE 2 MG/1
2 TABLET ORAL EVERY MORNING
COMMUNITY
Start: 2025-02-05
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
## 2025-03-19 NOTE — PATIENT INSTRUCTIONS
Please follow-up with eye doctor/continue to follow regularly for dilated eye exam/or fundus picture and please try and have note sent to me, personally call me if they ever tell you that you have papilledema or anything else they feel is concerning and your neurologist needs to know as the notes do not always make it to me.      Headache/migraine treatment:   Abortive medications (for immediate treatment of a headache):   It is ok to take ibuprofen, acetaminophen or naproxen (Advil, Tylenol,  Aleve, Excedrin) if they help your headaches you should limit these to No more than 3 times a week to avoid medication overuse/rebound headaches.     For your more moderate to severe migraines take this medication early   -   Ubrelvy 100 mg at migraine onset and can repeat in 2 hours if needed for max 2 doses in 24 hours and you get 16 tabs a month as there is some likelihood that the more you take this it also helps with prevention    If you have a co-pay that is anything of significance there is a coupon card  @ubrelvy.com to cover this    Back up:  -     dexamethasone (DECADRON) 4 mg tablet; Can take 8 mg p.o. with breakfast for 1 to 2 days followed by 4 mg for 1 to 5 days for unrelenting migraine      Over the counter preventive supplements for headaches/migraines (if you try, try for 3 months straight)   (to take every day to help prevent headaches - not to take at the time of headache):  There are combo pills online of these - none of which regulated by FDA and double check dosing - take appropriate dose only once a day- preventa migraine, migravent, mind ease, migrelief   []  Magnesium 400mg daily (If any diarrhea or upset stomach, decrease dose  as tolerated)  []  Riboflavin (Vitamin B2) 400mg daily - try online   (FYI B2 may make your urine bright/neon yellow)  [] Butter bur     Prescription preventive medications for headaches/migraines   (to take every day to help prevent headaches - not to take at the time of  headache):  [x]  We have options if needed/wanted        Lifestyle Recommendations:  [x] SLEEP - Maintain a regular sleep schedule: Adults need at least 7-8 hours of uninterrupted a night. Maintain good sleep hygiene:  Going to bed and waking up at consistent times, avoiding excessive daytime naps, avoiding caffeinated beverages in the evening, avoid excessive stimulation in the evening and generally using bed primarily for sleeping.  One hour before bedtime would recommend turning lights down lower, decreasing your activity (may read quietly, listen to music at a low volume). When you get into bed, should eliminate all technology (no texting, emailing, playing with your phone, iPad or tablet in bed).  [x] HYDRATION - Maintain good hydration.  Drink  2L of fluid a day (4 typical small water bottles)  [x] DIET - Maintain good nutrition. In particular don't skip meals and try and eat healthy balanced meals regularly.  [x] TRIGGERS - Look for other triggers and avoid them: Limit caffeine to 1-2 cups a day or less. Avoid dietary triggers that you have noticed bring on your headaches (this could include aged cheese, peanuts, MSG, aspartame and nitrates).  [x] EXERCISE - physical exercise as we all know is good for you in many ways, and not only is good for your heart, but also is beneficial for your mental health, cognitive health and  chronic pain/headaches. I would encourage at the least 5 days of physical exercise weekly for at least 30 minutes.     Education and Follow-up  [x] Please call with any questions or concerns. Of course if any new concerning symptoms go to the emergency department.  [x] Follow up August 2025, sooner if needed

## 2025-03-19 NOTE — PROGRESS NOTES
Review of Systems   Constitutional:  Negative for appetite change and fever.   HENT: Negative.  Negative for hearing loss, tinnitus, trouble swallowing and voice change.         Phonophobia   Eyes:  Positive for photophobia. Negative for pain.   Respiratory: Negative.  Negative for shortness of breath.    Cardiovascular: Negative.  Negative for palpitations.   Gastrointestinal: Negative.  Negative for nausea and vomiting.   Endocrine: Negative.  Negative for cold intolerance.   Genitourinary: Negative.  Negative for dysuria, frequency and urgency.   Musculoskeletal: Negative.  Negative for myalgias and neck pain.   Skin: Negative.  Negative for rash.   Neurological:  Positive for headaches (1 a week). Negative for dizziness, tremors, seizures, syncope, facial asymmetry, speech difficulty, weakness, light-headedness and numbness.   Hematological: Negative.  Does not bruise/bleed easily.   Psychiatric/Behavioral: Negative.  Negative for confusion, hallucinations and sleep disturbance.

## 2025-07-13 ENCOUNTER — HOSPITAL ENCOUNTER (EMERGENCY)
Facility: HOSPITAL | Age: 20
Discharge: HOME | End: 2025-07-13
Attending: EMERGENCY MEDICINE | Admitting: EMERGENCY MEDICINE
Payer: COMMERCIAL

## 2025-07-13 VITALS
RESPIRATION RATE: 18 BRPM | TEMPERATURE: 97.7 F | OXYGEN SATURATION: 98 % | HEART RATE: 75 BPM | DIASTOLIC BLOOD PRESSURE: 70 MMHG | SYSTOLIC BLOOD PRESSURE: 111 MMHG

## 2025-07-13 DIAGNOSIS — R06.02 SHORTNESS OF BREATH: ICD-10-CM

## 2025-07-13 DIAGNOSIS — R05.9 COUGH: Primary | ICD-10-CM

## 2025-07-13 LAB
ALBUMIN SERPL BCG-MCNC: 4.4 G/DL (ref 3.5–5)
ALP SERPL-CCNC: 65 U/L (ref 34–104)
ALT SERPL W P-5'-P-CCNC: 37 U/L (ref 7–52)
ANION GAP SERPL CALCULATED.3IONS-SCNC: 11 MMOL/L (ref 4–13)
AST SERPL W P-5'-P-CCNC: 49 U/L (ref 13–39)
BASOPHILS # BLD MANUAL: 0 THOUSAND/UL (ref 0–0.1)
BASOPHILS NFR MAR MANUAL: 0 % (ref 0–1)
BILIRUB SERPL-MCNC: 0.39 MG/DL (ref 0.2–1)
BUN SERPL-MCNC: 11 MG/DL (ref 5–25)
CALCIUM SERPL-MCNC: 9.6 MG/DL (ref 8.4–10.2)
CHLORIDE SERPL-SCNC: 102 MMOL/L (ref 96–108)
CO2 SERPL-SCNC: 25 MMOL/L (ref 21–32)
CREAT SERPL-MCNC: 0.93 MG/DL (ref 0.6–1.3)
D DIMER PPP FEU-MCNC: 0.46 UG/ML FEU
EOSINOPHIL # BLD MANUAL: 0 THOUSAND/UL (ref 0–0.4)
EOSINOPHIL NFR BLD MANUAL: 0 % (ref 0–6)
ERYTHROCYTE [DISTWIDTH] IN BLOOD BY AUTOMATED COUNT: 12.4 % (ref 11.6–15.1)
GFR SERPL CREATININE-BSD FRML MDRD: 88 ML/MIN/1.73SQ M
GLUCOSE SERPL-MCNC: 89 MG/DL (ref 65–140)
HCT VFR BLD AUTO: 47.9 % (ref 34.8–46.1)
HGB BLD-MCNC: 15.9 G/DL (ref 11.5–15.4)
LYMPHOCYTES # BLD AUTO: 2.91 THOUSAND/UL (ref 0.6–4.47)
LYMPHOCYTES # BLD AUTO: 49 % (ref 14–44)
MCH RBC QN AUTO: 29.2 PG (ref 26.8–34.3)
MCHC RBC AUTO-ENTMCNC: 33.2 G/DL (ref 31.4–37.4)
MCV RBC AUTO: 88 FL (ref 82–98)
MONOCYTES # BLD AUTO: 0.47 THOUSAND/UL (ref 0–1.22)
MONOCYTES NFR BLD: 8 % (ref 4–12)
NEUTROPHILS # BLD MANUAL: 2.55 THOUSAND/UL (ref 1.85–7.62)
NEUTS BAND NFR BLD MANUAL: 3 % (ref 0–8)
NEUTS SEG NFR BLD AUTO: 40 % (ref 43–75)
PLATELET # BLD AUTO: 232 THOUSANDS/UL (ref 149–390)
PLATELET BLD QL SMEAR: ADEQUATE
PMV BLD AUTO: 10.3 FL (ref 8.9–12.7)
POTASSIUM SERPL-SCNC: 3.5 MMOL/L (ref 3.5–5.3)
PROT SERPL-MCNC: 8.9 G/DL (ref 6.4–8.4)
RBC # BLD AUTO: 5.45 MILLION/UL (ref 3.81–5.12)
RBC MORPH BLD: NORMAL
SODIUM SERPL-SCNC: 138 MMOL/L (ref 135–147)
WBC # BLD AUTO: 5.93 THOUSAND/UL (ref 4.31–10.16)

## 2025-07-13 PROCEDURE — 85027 COMPLETE CBC AUTOMATED: CPT

## 2025-07-13 PROCEDURE — 96360 HYDRATION IV INFUSION INIT: CPT

## 2025-07-13 PROCEDURE — 99284 EMERGENCY DEPT VISIT MOD MDM: CPT | Performed by: EMERGENCY MEDICINE

## 2025-07-13 PROCEDURE — 80053 COMPREHEN METABOLIC PANEL: CPT

## 2025-07-13 PROCEDURE — 85007 BL SMEAR W/DIFF WBC COUNT: CPT

## 2025-07-13 PROCEDURE — 99284 EMERGENCY DEPT VISIT MOD MDM: CPT

## 2025-07-13 PROCEDURE — 93005 ELECTROCARDIOGRAM TRACING: CPT

## 2025-07-13 PROCEDURE — 85379 FIBRIN DEGRADATION QUANT: CPT

## 2025-07-13 PROCEDURE — 36415 COLL VENOUS BLD VENIPUNCTURE: CPT

## 2025-07-13 RX ORDER — ALBUTEROL SULFATE 90 UG/1
2 INHALANT RESPIRATORY (INHALATION) EVERY 6 HOURS PRN
Qty: 8.5 G | Refills: 0 | Status: SHIPPED | OUTPATIENT
Start: 2025-07-13

## 2025-07-13 RX ORDER — PREDNISONE 50 MG/1
50 TABLET ORAL DAILY
Qty: 5 TABLET | Refills: 0 | Status: SHIPPED | OUTPATIENT
Start: 2025-07-13 | End: 2025-07-18

## 2025-07-13 RX ORDER — ALBUTEROL SULFATE 5 MG/ML
2.5 SOLUTION RESPIRATORY (INHALATION) ONCE
Status: COMPLETED | OUTPATIENT
Start: 2025-07-13 | End: 2025-07-13

## 2025-07-13 RX ORDER — SODIUM CHLORIDE FOR INHALATION 0.9 %
3 VIAL, NEBULIZER (ML) INHALATION ONCE
Status: COMPLETED | OUTPATIENT
Start: 2025-07-13 | End: 2025-07-13

## 2025-07-13 RX ADMIN — ALBUTEROL SULFATE 2.5 MG: 2.5 SOLUTION RESPIRATORY (INHALATION) at 13:44

## 2025-07-13 RX ADMIN — ISODIUM CHLORIDE 3 ML: 0.03 SOLUTION RESPIRATORY (INHALATION) at 13:44

## 2025-07-13 RX ADMIN — SODIUM CHLORIDE 1000 ML: 0.9 INJECTION, SOLUTION INTRAVENOUS at 13:41

## 2025-07-13 NOTE — ED PROVIDER NOTES
Time reflects when diagnosis was documented in both MDM as applicable and the Disposition within this note       Time User Action Codes Description Comment    7/13/2025  2:04 PM Aditi Franks Add [R05.9] Cough     7/13/2025  2:04 PM Aditi Franks Add [R06.02] Shortness of breath           ED Disposition       ED Disposition   Discharge    Condition   Stable    Date/Time   Sun Jul 13, 2025  2:04 PM    Comment   Jolly Ibanez discharge to home/self care.                   Assessment & Plan       Medical Decision Making  Jolly Ibanez is a 20 y.o. female presenting with productive cough without fevers. Vitals remarkable for tachycardia. Exam remarkable for transmitted upper respiratory noises, no wheezes, rales or rhonchi.      DDx including but not limited to: URI, bronchitis, pneumonia, GERD, aspiration pneumonitis, viral illness.    See ED course for further updates and interpretation of results.    Based on these results and H&P, most suspect bronchitis as etiology of patient's symptoms. D-dimer negative, unlikely to have PE. She had symptomatic relief in the ED. Prescribed albuterol for symptomatic relief and short course of prednisone. She is stable for discharge.    Results, clinical impressions, and plan were discussed with patient and family. They expressed understanding and were in agreement with plan. Patient was given the opportunity to ask questions in ED. All questions and concerns were addressed in ED.    After evaluation and workup in the emergency department Patient appears well, is nontoxic appearing, expresses understanding and agrees with plan of care at this time.  In light of this patient would benefit from outpatient management. Discussed strict return precautions.  Discussed importance of following up with PCP in the next few days.  All questions answered.  Patient is agreeable to discharge.    Amount and/or Complexity of Data Reviewed  Labs: ordered. Decision-making details documented in  ED Course.    Risk  Prescription drug management.        ED Course as of 07/15/25 1025   Sun Jul 13, 2025   [facility], evaluated. Per my interpretation, no acute consolidation.   1354 CBC and differential(!)   1401 D-Dimer, Quant: 0.46  Within normal limits, unlikely to be PE at this time.   1401 Pulse: 75  improved   1404 Comprehensive metabolic panel(!)  Grossly unremarkable       Medications   sodium chloride 0.9 % bolus 1,000 mL (0 mL Intravenous Stopped 7/13/25 1441)   albuterol inhalation solution 2.5 mg (2.5 mg Nebulization Given 7/13/25 1344)   sodium chloride 0.9 % inhalation solution 3 mL (3 mL Nebulization Given 7/13/25 1344)       ED Risk Strat Scores                    No data recorded    PERC Rule for PE      Flowsheet Row Most Recent Value   PERC Rule for PE    Age >=50 0 Filed at: 07/13/2025 1319   HR >=100 1 Filed at: 07/13/2025 1319   O2 Sat on room air < 95% 0 Filed at: 07/13/2025 1319   History of PE or DVT 0 Filed at: 07/13/2025 1319   Recent trauma or surgery 0 Filed at: 07/13/2025 1319   Hemoptysis 0 Filed at: 07/13/2025 1319   Exogenous estrogen 1 Filed at: 07/13/2025 1319   Unilateral leg swelling 0 Filed at: 07/13/2025 1319   PERC Rule for PE Results 2 Filed at: 07/13/2025 1319                Wells' Criteria for PE      Flowsheet Row Most Recent Value   Wells' Criteria for PE    Clinical signs and symptoms of DVT 0 Filed at: 07/13/2025 1319   PE is primary diagnosis or equally likely 0 Filed at: 07/13/2025 1319   HR >100 1.5 Filed at: 07/13/2025 1319   Immobilization at least 3 days or Surgery in the previous 4 weeks 0 Filed at: 07/13/2025 1319   Previous, objectively diagnosed PE or DVT 0 Filed at: 07/13/2025 1319   Hemoptysis 0 Filed at: 07/13/2025 1319   Malignancy with treatment within 6 months or palliative 0 Filed at: 07/13/2025 1319   Wells' Criteria Total 1.5 Filed at: 07/13/2025 8589                        History of Present Illness       Chief Complaint    Patient presents with    Shortness of Breath     Sob and cough negative flu/covid clear cxr sent by urgent care to r/o PE       Past Medical History[1]   Past Surgical History[2]   Family History[3]   Social History[4]   E-Cigarette/Vaping    E-Cigarette Use Never User       E-Cigarette/Vaping Substances    Nicotine No     THC No     CBD No     Flavoring No     Other No     Unknown No       I have reviewed and agree with the history as documented.     Jolly Ibanez is a 20 y.o. female with history of migraine, anxiety presenting for shortness of breath.    Patient reports about a week of cough, fatigue and worsening shortness of breath, initially with fevers which improved and have no occurred in the last two days. Patient reports cough became productive of dark yellow sputum starting today. [facility] for productive cough and shortness of breath. There she was given a nebulizer treatment with some improvement of her symptoms and told to present to the ED to evaluate for PE. No known sick contacts, no history of reactive airway disease.    No active cancer, not bed ridden, no collateral superficial veins, entire leg is not swollen, no localized tenderness along the deep venous system, no pitting edema confined to the symptomatic leg, no paralysis paresis or recent plaster immobilization, no history DVT. She is on birth control.      Shortness of Breath  Associated symptoms: cough    Associated symptoms: no abdominal pain, no chest pain, no fever, no rash and no vomiting        Review of Systems   Constitutional:  Positive for fatigue. Negative for activity change, appetite change, chills and fever.   HENT:  Positive for congestion. Negative for hearing loss and trouble swallowing.    Eyes:  Negative for discharge and redness.   Respiratory:  Positive for cough and shortness of breath.    Cardiovascular:  Negative for chest pain and palpitations.   Gastrointestinal:  Negative for  abdominal pain, constipation, diarrhea, nausea and vomiting.   Genitourinary:  Negative for decreased urine volume, dysuria, frequency and hematuria.   Musculoskeletal:  Positive for myalgias. Negative for arthralgias and back pain.   Skin:  Negative for color change and rash.   Neurological:  Negative for dizziness, seizures, syncope, weakness and light-headedness.   Psychiatric/Behavioral:  Negative for dysphoric mood.    All other systems reviewed and are negative.          Objective       ED Triage Vitals   Temperature Pulse Blood Pressure Respirations SpO2 Patient Position - Orthostatic VS   07/13/25 1257 07/13/25 1257 07/13/25 1257 07/13/25 1257 07/13/25 1257 07/13/25 1257   98.4 °F (36.9 °C) (!) 106 137/84 18 97 % Sitting      Temp Source Heart Rate Source BP Location FiO2 (%) Pain Score    07/13/25 1257 07/13/25 1330 07/13/25 1257 -- --    Oral Monitor Right arm        Vitals      Date and Time Temp Pulse SpO2 Resp BP Pain Score FACES Pain Rating User   07/13/25 1345 -- 75 98 % 18 111/70 -- -- AD   07/13/25 1330 97.7 °F (36.5 °C) 94 99 % 18 104/63 -- -- NN   07/13/25 1257 98.4 °F (36.9 °C) 106 97 % 18 137/84 -- -- RLN            Physical Exam  Vitals and nursing note reviewed.   Constitutional:       General: She is not in acute distress.     Appearance: She is well-developed.   HENT:      Head: Normocephalic and atraumatic.      Mouth/Throat:      Mouth: Mucous membranes are moist.      Pharynx: Oropharynx is clear.     Eyes:      Extraocular Movements: Extraocular movements intact.      Conjunctiva/sclera: Conjunctivae normal.      Pupils: Pupils are equal, round, and reactive to light.       Cardiovascular:      Rate and Rhythm: Normal rate and regular rhythm.      Pulses: Normal pulses.      Heart sounds: Normal heart sounds.   Pulmonary:      Effort: Pulmonary effort is normal. No respiratory distress.      Breath sounds: Normal breath sounds. No wheezing, rhonchi or rales.   Abdominal:      General:  Abdomen is flat. There is no distension.      Palpations: Abdomen is soft.      Tenderness: There is no abdominal tenderness. There is no right CVA tenderness, left CVA tenderness, guarding or rebound.     Musculoskeletal:         General: No tenderness or deformity. Normal range of motion.      Cervical back: Normal range of motion.      Right lower leg: No edema.      Left lower leg: No edema.     Skin:     General: Skin is warm and dry.      Capillary Refill: Capillary refill takes less than 2 seconds.     Neurological:      General: No focal deficit present.      Mental Status: She is alert and oriented to person, place, and time.      Sensory: No sensory deficit.      Motor: No weakness.     Psychiatric:         Mood and Affect: Mood normal.         Behavior: Behavior normal.         Results Reviewed       Procedure Component Value Units Date/Time    RBC Morphology Reflex Test [[id number]] Collected: 07/13/25 1332    Lab Status: Final result Specimen: Blood from Arm, Left Updated: 07/13/25 1501    CBC and differential [[id number]]  (Abnormal) Collected: 07/13/25 1332    Lab Status: Final result Specimen: Blood from Arm, Left Updated: 07/13/25 1415     WBC 5.93 Thousand/uL      RBC 5.45 Million/uL      Hemoglobin 15.9 g/dL      Hematocrit 47.9 %      MCV 88 fL      MCH 29.2 pg      MCHC 33.2 g/dL      RDW 12.4 %      MPV 10.3 fL      Platelets 232 Thousands/uL     Narrative:      This is an appended report.  These results have been appended to a previously verified report.    Manual Differential(PHLEBS Do Not Order) [[id number]]  (Abnormal) Collected: 07/13/25 1332    Lab Status: Final result Specimen: Blood from Arm, Left Updated: 07/13/25 1415     Segmented % 40 %      Bands % 3 %      Lymphocytes % 49 %      Monocytes % 8 %      Eosinophils % 0 %      Basophils % 0 %      Absolute Neutrophils 2.55 Thousand/uL      Absolute Lymphocytes 2.91 Thousand/uL      Absolute Monocytes 0.47 Thousand/uL      Absolute  Eosinophils 0.00 Thousand/uL      Absolute Basophils 0.00 Thousand/uL      Total Counted --     RBC Morphology Normal     Platelet Estimate Adequate    Comprehensive metabolic panel [[id number]]  (Abnormal) Collected: 07/13/25 1332    Lab Status: Final result Specimen: Blood from Arm, Left Updated: 07/13/25 1403     Sodium 138 mmol/L      Potassium 3.5 mmol/L      Chloride 102 mmol/L      CO2 25 mmol/L      ANION GAP 11 mmol/L      BUN 11 mg/dL      Creatinine 0.93 mg/dL      Glucose 89 mg/dL      Calcium 9.6 mg/dL      AST 49 U/L      ALT 37 U/L      Alkaline Phosphatase 65 U/L      Total Protein 8.9 g/dL      Albumin 4.4 g/dL      Total Bilirubin 0.39 mg/dL      eGFR 88 ml/min/1.73sq m     Narrative:      National Kidney Disease Foundation guidelines for Chronic Kidney Disease (CKD):     Stage 1 with normal or high GFR (GFR > 90 mL/min/1.73 square meters)    Stage 2 Mild CKD (GFR = 60-89 mL/min/1.73 square meters)    Stage 3A Moderate CKD (GFR = 45-59 mL/min/1.73 square meters)    Stage 3B Moderate CKD (GFR = 30-44 mL/min/1.73 square meters)    Stage 4 Severe CKD (GFR = 15-29 mL/min/1.73 square meters)    Stage 5 End Stage CKD (GFR <15 mL/min/1.73 square meters)  Note: GFR calculation is accurate only with a steady state creatinine    D-Dimer [[id number]]  (Normal) Collected: 07/13/25 1332    Lab Status: Final result Specimen: Blood from Arm, Left Updated: 07/13/25 1400     D-Dimer, Quant 0.46 ug/ml FEU             No orders to display       Procedures    ED Medication and Procedure Management   Prior to Admission Medications   Prescriptions Last Dose Informant Patient Reported? Taking?   Ubrogepant (UBRELVY) 100 MG tablet  Self No No   Sig: Take 1 tablet (100 mg) one time as needed for migraine. May repeat one additional tablet (100 mg) at least two hours after the first dose. Do not use more than two doses per day   acetaminophen (TYLENOL) 500 mg tablet  Self Yes No   Sig: Take 500 mg by mouth every 6 (six) hours  as needed for mild pain   citalopram (CeleXA) 20 mg tablet  Self No No   Sig: Take 1 tablet (20 mg total) by mouth every morning   drospirenone-ethinyl estradiol (FLIP) 3-0.02 MG per tablet  Self No No   Sig: Take 1 tablet by mouth every morning   fexofenadine (ALLEGRA) 60 MG tablet  Father, Self Yes No   Sig: Take 60 mg by mouth every morning   guanFACINE (TENEX) 2 MG tablet   Yes No   Sig: Take 2 mg by mouth every morning   lamoTRIgine (LaMICtal) 100 mg tablet  Self Yes No   Sig: Take 100 mg by mouth every morning      Facility-Administered Medications: None     Discharge Medication List as of 7/13/2025  2:14 PM        CONTINUE these medications which have NOT CHANGED    Details   acetaminophen (TYLENOL) 500 mg tablet Take 500 mg by mouth every 6 (six) hours as needed for mild pain, Historical Med      citalopram (CeleXA) 20 mg tablet Take 1 tablet (20 mg total) by mouth every morning, Starting Fri 2/23/2024, Normal      drospirenone-ethinyl estradiol (FLIP) 3-0.02 MG per tablet Take 1 tablet by mouth every morning, Starting Wed 3/20/2024, Normal      fexofenadine (ALLEGRA) 60 MG tablet Take 60 mg by mouth every morning, Historical Med      guanFACINE (TENEX) 2 MG tablet Take 2 mg by mouth every morning, Starting Wed 2/5/2025, Historical Med      lamoTRIgine (LaMICtal) 100 mg tablet Take 100 mg by mouth every morning, Starting Mon 5/16/2022, Historical Med      Ubrogepant (UBRELVY) 100 MG tablet Take 1 tablet (100 mg) one time as needed for migraine. May repeat one additional tablet (100 mg) at least two hours after the first dose. Do not use more than two doses per day, Normal           No discharge procedures on file.  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   [2]   Past Surgical History:  Procedure Laterality Date    ANKLE SURGERY Right     CO EXC/CURTG CST/B9 SOWMYA TALUS/CLCNS W/ILIAC/AGRFT Right 1/9/2024    Procedure: 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

## 2025-07-13 NOTE — DISCHARGE INSTRUCTIONS
You were evaluated in the emergency department for: cough. You can access your current and pending results through [employer] [facility]. A radiologist will take a second look at your X-Rays, if you had any, and you will be contacted with any new findings.    - You should follow-up with your primary care provider, as soon as possible, for re-evaluation.    Please, return to the emergency department if you experience new or worsening symptoms, fever, chest pain, shortness of breath, difficulty breathing, dizziness, abdominal pain, persistent nausea/vomiting, syncope or passing out, blood in your urine or stool, coughing up blood, leg swelling/pain, urinary retention, bowel or bladder incontinence, numbness between your legs.

## 2025-07-13 NOTE — Clinical Note
Jolly Ibanez was seen and treated in our emergency department on 7/13/2025.                Diagnosis:     Jolly  may return to work on return date.    She may return on this date: 07/17/2025         If you have any questions or concerns, please don't hesitate to call.      Aditi Franks MD    ______________________________           _______________          _______________  Hospital Representative                              Date                                Time

## 2025-07-13 NOTE — ED ATTENDING ATTESTATION
7/13/2025  IDominick MD, saw and evaluated the patient. I have discussed the patient with the resident/non-physician practitioner and agree with the resident's/non-physician practitioner's findings, Plan of Care, and MDM as documented in the resident's/non-physician practitioner's note, except where noted. All available labs and Radiology studies were reviewed.  I was present for key portions of any procedure(s) performed by the resident/non-physician practitioner and I was immediately available to provide assistance.       At this point I agree with the current assessment done in the Emergency Department.  I have conducted an independent evaluation of this patient a history and physical is as follows:    This is a 20-year-old female without any significant related past medical history presenting to the ED today for complaint of cough fatigue and shortness of breath.  Patient states that she initially had fevers, and those improved.  She has not had any recurrence of the fever since then.  She has dark yellow sputum production and has been having this today.  Patient states that she was given a nebulizer treatment previously at urgent care, and because of her risk factors they were concerned that she would need to come to the ED to rule out PE.  She does not have a history of DVT, is on birth control, and arrives slightly tachycardic.  [facility] not show any acute abnormalities.  Her exam for the most part is unremarkable aside from some chest congestion, rhonchorous breath sounds.  Patient prescribed albuterol nebulizer solution.  She felt better with nebulizer treatment here in the ED.  No indication for antibiotics based on our evaluation.  Steroids ordered.  The management plan was discussed in detail with the patient at bedside and all questions were answered. Strict ED return instructions were discussed at bedside. Prior to discharge, both verbal and written instructions were  "provided. We discussed the signs and symptoms that should prompt the patient to return to the ED. All questions were answered and the patient was comfortable with the plan of care and discharged home. The patient agrees to return to the Emergency Department for concerns and/or progression of illness.    Portions of the above record have been created with voice recognition software.  Occasional wrong word or \"sound alike\" substitutions may have occurred due to the inherent limitations of voice recognition software.  Read the chart carefully and recognize, using context, where substitutions may have occurred.      Dimer negative, low risk [place].  ED Course  ED Course as of 07/13/25 1444   Sun Jul 13, 2025   1353 Hemoglobin(!): 15.9         Critical Care Time  Procedures      "

## 2025-07-16 LAB
ATRIAL RATE: 99 BPM
P AXIS: 65 DEGREES
PR INTERVAL: 124 MS
QRS AXIS: 55 DEGREES
QRSD INTERVAL: 86 MS
QT INTERVAL: 364 MS
QTC INTERVAL: 467 MS
T WAVE AXIS: -33 DEGREES
VENTRICULAR RATE: 99 BPM

## 2025-07-16 PROCEDURE — 93010 ELECTROCARDIOGRAM REPORT: CPT | Performed by: INTERNAL MEDICINE

## 2025-07-18 ENCOUNTER — RA CDI HCC (OUTPATIENT)
Dept: OTHER | Facility: HOSPITAL | Age: 20
End: 2025-07-18

## 2025-07-18 NOTE — PROGRESS NOTES
Please review if this dx is applicable to the patient's condition and assess and document, if applicable in next visit    [id number]        [id number]    HCC coding opportunities          Chart Reviewed number of suggestions sent to Provider: 2     Patients Insurance        Commercial Insurance: [employer] Commercial [employer]

## 2025-07-25 PROBLEM — R74.8 ELEVATED LIVER ENZYMES: Status: ACTIVE | Noted: 2025-07-25

## 2025-07-25 PROBLEM — R79.89 ABNORMAL CBC: Status: ACTIVE | Noted: 2025-07-25

## 2025-07-25 PROBLEM — M79.671 RIGHT FOOT PAIN: Status: RESOLVED | Noted: 2023-11-24 | Resolved: 2025-07-25

## 2025-07-25 PROBLEM — Z00.00 ANNUAL PHYSICAL EXAM: Status: ACTIVE | Noted: 2025-07-25

## 2025-07-30 ENCOUNTER — PATIENT MESSAGE (OUTPATIENT)
Dept: FAMILY MEDICINE CLINIC | Facility: CLINIC | Age: 20
End: 2025-07-30

## 2025-07-30 ENCOUNTER — OFFICE VISIT (OUTPATIENT)
Dept: FAMILY MEDICINE CLINIC | Facility: CLINIC | Age: 20
End: 2025-07-30
Payer: COMMERCIAL

## 2025-07-30 VITALS
WEIGHT: 241 LBS | DIASTOLIC BLOOD PRESSURE: 80 MMHG | OXYGEN SATURATION: 99 % | RESPIRATION RATE: 16 BRPM | TEMPERATURE: 97.3 F | SYSTOLIC BLOOD PRESSURE: 120 MMHG | HEIGHT: 63 IN | HEART RATE: 87 BPM | BODY MASS INDEX: 42.7 KG/M2

## 2025-07-30 DIAGNOSIS — G43.009 MIGRAINE WITHOUT AURA AND WITHOUT STATUS MIGRAINOSUS, NOT INTRACTABLE: ICD-10-CM

## 2025-07-30 DIAGNOSIS — R74.8 ELEVATED LIVER ENZYMES: ICD-10-CM

## 2025-07-30 DIAGNOSIS — J30.2 SEASONAL ALLERGIES: ICD-10-CM

## 2025-07-30 DIAGNOSIS — R79.89 ABNORMAL CBC: ICD-10-CM

## 2025-07-30 DIAGNOSIS — E66.01 MORBID OBESITY WITH BMI OF 40.0-44.9, ADULT (HCC): ICD-10-CM

## 2025-07-30 DIAGNOSIS — F39 MOOD DISORDER (HCC): ICD-10-CM

## 2025-07-30 DIAGNOSIS — Z00.00 ANNUAL PHYSICAL EXAM: Primary | ICD-10-CM

## 2025-07-30 PROBLEM — G43.111 INTRACTABLE MIGRAINE WITH AURA WITH STATUS MIGRAINOSUS: Status: RESOLVED | Noted: 2024-02-23 | Resolved: 2025-07-30

## 2025-07-30 PROBLEM — M25.571 CHRONIC PAIN OF RIGHT ANKLE: Status: RESOLVED | Noted: 2023-08-07 | Resolved: 2025-07-30

## 2025-07-30 PROBLEM — G89.29 CHRONIC PAIN OF RIGHT ANKLE: Status: RESOLVED | Noted: 2023-08-07 | Resolved: 2025-07-30

## 2025-07-30 PROCEDURE — 99395 PREV VISIT EST AGE 18-39: CPT | Performed by: FAMILY MEDICINE

## 2025-07-31 ENCOUNTER — APPOINTMENT (OUTPATIENT)
Dept: LAB | Facility: CLINIC | Age: 20
End: 2025-07-31
Payer: COMMERCIAL

## 2025-07-31 DIAGNOSIS — R94.31 ABNORMAL ECG: Primary | ICD-10-CM

## 2025-07-31 DIAGNOSIS — Z00.00 ANNUAL PHYSICAL EXAM: ICD-10-CM

## 2025-07-31 LAB
ALBUMIN SERPL BCG-MCNC: 3.8 G/DL (ref 3.5–5)
ALP SERPL-CCNC: 73 U/L (ref 34–104)
ALT SERPL W P-5'-P-CCNC: 29 U/L (ref 7–52)
ANION GAP SERPL CALCULATED.3IONS-SCNC: 10 MMOL/L (ref 4–13)
AST SERPL W P-5'-P-CCNC: 19 U/L (ref 13–39)
BASOPHILS # BLD AUTO: 0.03 THOUSANDS/ÂΜL (ref 0–0.1)
BASOPHILS NFR BLD AUTO: 0 % (ref 0–1)
BILIRUB SERPL-MCNC: 0.4 MG/DL (ref 0.2–1)
BUN SERPL-MCNC: 10 MG/DL (ref 5–25)
CALCIUM SERPL-MCNC: 10.1 MG/DL (ref 8.4–10.2)
CHLORIDE SERPL-SCNC: 104 MMOL/L (ref 96–108)
CHOLEST SERPL-MCNC: 200 MG/DL (ref ?–200)
CO2 SERPL-SCNC: 24 MMOL/L (ref 21–32)
CREAT SERPL-MCNC: 0.77 MG/DL (ref 0.6–1.3)
EOSINOPHIL # BLD AUTO: 0.06 THOUSAND/ÂΜL (ref 0–0.61)
EOSINOPHIL NFR BLD AUTO: 1 % (ref 0–6)
ERYTHROCYTE [DISTWIDTH] IN BLOOD BY AUTOMATED COUNT: 12 % (ref 11.6–15.1)
EST. AVERAGE GLUCOSE BLD GHB EST-MCNC: 111 MG/DL
GFR SERPL CREATININE-BSD FRML MDRD: 110 ML/MIN/1.73SQ M
GLUCOSE P FAST SERPL-MCNC: 82 MG/DL (ref 65–99)
HBA1C MFR BLD: 5.5 %
HCT VFR BLD AUTO: 38.7 % (ref 34.8–46.1)
HDLC SERPL-MCNC: 53 MG/DL
HGB BLD-MCNC: 13 G/DL (ref 11.5–15.4)
IMM GRANULOCYTES # BLD AUTO: 0.02 THOUSAND/UL (ref 0–0.2)
IMM GRANULOCYTES NFR BLD AUTO: 0 % (ref 0–2)
LDLC SERPL CALC-MCNC: 119 MG/DL (ref 0–100)
LYMPHOCYTES # BLD AUTO: 2 THOUSANDS/ÂΜL (ref 0.6–4.47)
LYMPHOCYTES NFR BLD AUTO: 27 % (ref 14–44)
MCH RBC QN AUTO: 29.3 PG (ref 26.8–34.3)
MCHC RBC AUTO-ENTMCNC: 33.6 G/DL (ref 31.4–37.4)
MCV RBC AUTO: 87 FL (ref 82–98)
MONOCYTES # BLD AUTO: 0.48 THOUSAND/ÂΜL (ref 0.17–1.22)
MONOCYTES NFR BLD AUTO: 7 % (ref 4–12)
NEUTROPHILS # BLD AUTO: 4.7 THOUSANDS/ÂΜL (ref 1.85–7.62)
NEUTS SEG NFR BLD AUTO: 65 % (ref 43–75)
NONHDLC SERPL-MCNC: 147 MG/DL
NRBC BLD AUTO-RTO: 0 /100 WBCS
PLATELET # BLD AUTO: 331 THOUSANDS/UL (ref 149–390)
PMV BLD AUTO: 10.7 FL (ref 8.9–12.7)
POTASSIUM SERPL-SCNC: 4.2 MMOL/L (ref 3.5–5.3)
PROT SERPL-MCNC: 7.8 G/DL (ref 6.4–8.4)
RBC # BLD AUTO: 4.44 MILLION/UL (ref 3.81–5.12)
SODIUM SERPL-SCNC: 138 MMOL/L (ref 135–147)
TRIGL SERPL-MCNC: 138 MG/DL (ref ?–150)
TSH SERPL DL<=0.05 MIU/L-ACNC: 1.59 UIU/ML (ref 0.45–4.5)
WBC # BLD AUTO: 7.29 THOUSAND/UL (ref 4.31–10.16)

## 2025-07-31 PROCEDURE — 83036 HEMOGLOBIN GLYCOSYLATED A1C: CPT

## 2025-07-31 PROCEDURE — 86803 HEPATITIS C AB TEST: CPT

## 2025-07-31 PROCEDURE — 80053 COMPREHEN METABOLIC PANEL: CPT

## 2025-07-31 PROCEDURE — 85025 COMPLETE CBC W/AUTO DIFF WBC: CPT

## 2025-07-31 PROCEDURE — 80061 LIPID PANEL: CPT

## 2025-07-31 PROCEDURE — 36415 COLL VENOUS BLD VENIPUNCTURE: CPT

## 2025-07-31 PROCEDURE — 84443 ASSAY THYROID STIM HORMONE: CPT

## 2025-08-01 LAB — HCV AB SER QL: NORMAL

## 2025-08-08 ENCOUNTER — CLINICAL SUPPORT (OUTPATIENT)
Dept: NUTRITION | Facility: HOSPITAL | Age: 20
End: 2025-08-08
Attending: FAMILY MEDICINE
Payer: COMMERCIAL

## 2025-08-08 VITALS — BODY MASS INDEX: 42.51 KG/M2 | WEIGHT: 240 LBS

## 2025-08-08 DIAGNOSIS — E66.01 MORBID OBESITY WITH BMI OF 40.0-44.9, ADULT (HCC): ICD-10-CM

## 2025-08-08 PROCEDURE — 97802 MEDICAL NUTRITION INDIV IN: CPT

## 2025-08-11 ENCOUNTER — TELEPHONE (OUTPATIENT)
Dept: NEUROLOGY | Facility: CLINIC | Age: 20
End: 2025-08-11

## (undated) DEVICE — BASIC DOUBLE BASIN 2-LF: Brand: MEDLINE INDUSTRIES, INC.

## (undated) DEVICE — GENERAL/ PLASTIC TRAY STANDARD: Brand: DEROYAL

## (undated) DEVICE — BANDAGE, ESMARK LF STR 4"X9'(20/CS): Brand: CYPRESS

## (undated) DEVICE — FABRIC REINFORCED, SURGICAL GOWN, XL: Brand: CONVERTORS

## (undated) DEVICE — U-DRAPE: Brand: CONVERTORS

## (undated) DEVICE — NEPTUNE E-SEP SMOKE EVACUATION PENCIL, COATED, 70MM BLADE, PUSH BUTTON SWITCH: Brand: NEPTUNE E-SEP

## (undated) DEVICE — OCCLUSIVE GAUZE STRIP,3% BISMUTH TRIBROMOPHENATE IN PETROLATUM BLEND: Brand: XEROFORM

## (undated) DEVICE — GLOVE SRG BIOGEL 7.5

## (undated) DEVICE — TIBURON EXTREMITY SHEET: Brand: CONVERTORS

## (undated) DEVICE — CHLORAPREP HI-LITE 26ML ORANGE

## (undated) DEVICE — K-WIRE DIAMOND POINT BOTH ENDS                                    .062 X 5
Type: IMPLANTABLE DEVICE | Site: FOOT | Status: NON-FUNCTIONAL
Removed: 2024-01-09

## (undated) DEVICE — GLOVE INDICATOR PI UNDERGLOVE SZ 7.5 BLUE

## (undated) DEVICE — ACE WRAP 4 IN UNSTERILE

## (undated) DEVICE — TOWEL SET X-RAY

## (undated) DEVICE — DRAPE KIT C-ARM W/PLATE PRTC FOOT SWITCH COVER

## (undated) DEVICE — K-WIRE DIAMOND POINT ROUND END                                    .045 X 9
Type: IMPLANTABLE DEVICE | Site: FOOT | Status: NON-FUNCTIONAL
Removed: 2024-01-09

## (undated) DEVICE — CURITY STRETCH BANDAGE: Brand: CURITY